# Patient Record
Sex: MALE | Race: BLACK OR AFRICAN AMERICAN
[De-identification: names, ages, dates, MRNs, and addresses within clinical notes are randomized per-mention and may not be internally consistent; named-entity substitution may affect disease eponyms.]

---

## 2019-11-18 ENCOUNTER — HOSPITAL ENCOUNTER (OUTPATIENT)
Dept: HOSPITAL 76 - ED | Age: 59
Setting detail: OBSERVATION
LOS: 2 days | Discharge: ANOTHER HOSPITAL | End: 2019-11-20
Attending: INTERNAL MEDICINE | Admitting: INTERNAL MEDICINE
Payer: COMMERCIAL

## 2019-11-18 DIAGNOSIS — I27.20: ICD-10-CM

## 2019-11-18 DIAGNOSIS — I08.3: ICD-10-CM

## 2019-11-18 DIAGNOSIS — E11.9: ICD-10-CM

## 2019-11-18 DIAGNOSIS — I50.9: ICD-10-CM

## 2019-11-18 DIAGNOSIS — L60.8: ICD-10-CM

## 2019-11-18 DIAGNOSIS — D50.9: ICD-10-CM

## 2019-11-18 DIAGNOSIS — I11.0: Primary | ICD-10-CM

## 2019-11-18 DIAGNOSIS — E87.6: ICD-10-CM

## 2019-11-18 DIAGNOSIS — E83.42: ICD-10-CM

## 2019-11-18 DIAGNOSIS — R06.01: ICD-10-CM

## 2019-11-18 LAB
ALBUMIN DIAFP-MCNC: 3.6 G/DL (ref 3.2–5.5)
ALBUMIN/GLOB SERPL: 1.2 {RATIO} (ref 1–2.2)
ALP SERPL-CCNC: 89 IU/L (ref 42–121)
ALT SERPL W P-5'-P-CCNC: 57 IU/L (ref 10–60)
ANION GAP SERPL CALCULATED.4IONS-SCNC: 9 MMOL/L (ref 6–13)
AST SERPL W P-5'-P-CCNC: 47 IU/L (ref 10–42)
BASOPHILS NFR BLD AUTO: 0 10^3/UL (ref 0–0.1)
BASOPHILS NFR BLD AUTO: 0.8 %
BILIRUB BLD-MCNC: 1.7 MG/DL (ref 0.2–1)
BUN SERPL-MCNC: 9 MG/DL (ref 6–20)
CALCIUM UR-MCNC: 8.8 MG/DL (ref 8.5–10.3)
CHLORIDE SERPL-SCNC: 101 MMOL/L (ref 101–111)
CO2 SERPL-SCNC: 25 MMOL/L (ref 21–32)
CREAT SERPLBLD-SCNC: 0.8 MG/DL (ref 0.6–1.2)
EOSINOPHIL # BLD AUTO: 0.1 10^3/UL (ref 0–0.7)
EOSINOPHIL NFR BLD AUTO: 1.7 %
ERYTHROCYTE [DISTWIDTH] IN BLOOD BY AUTOMATED COUNT: 15.6 % (ref 12–15)
EST. AVERAGE GLUCOSE BLD GHB EST-MCNC: 206 MG/DL (ref 70–100)
GFRSERPLBLD MDRD-ARVRAT: 121 ML/MIN/{1.73_M2} (ref 89–?)
GLOBULIN SER-MCNC: 3 G/DL (ref 2.1–4.2)
GLUCOSE SERPL-MCNC: 209 MG/DL (ref 70–100)
HB2 TOTAL: 12.2 G/DL
HBA1C BLD-MCNC: 0.88 G/DL
HEMOGLOBIN A1C %: 8.8 % (ref 4.6–6.2)
HGB UR QL STRIP: 12 G/DL (ref 14–18)
LIPASE SERPL-CCNC: 22 U/L (ref 22–51)
LYMPHOCYTES # SPEC AUTO: 1.2 10^3/UL (ref 1.5–3.5)
LYMPHOCYTES NFR BLD AUTO: 23.9 %
MCH RBC QN AUTO: 23.4 PG (ref 27–31)
MCHC RBC AUTO-ENTMCNC: 31.2 G/DL (ref 32–36)
MCV RBC AUTO: 75.2 FL (ref 80–94)
MONOCYTES # BLD AUTO: 0.3 10^3/UL (ref 0–1)
MONOCYTES NFR BLD AUTO: 6 %
NEUTROPHILS # BLD AUTO: 3.5 10^3/UL (ref 1.5–6.6)
NEUTROPHILS # SNV AUTO: 5.2 X10^3/UL (ref 4.8–10.8)
NEUTROPHILS NFR BLD AUTO: 67.4 %
PDW BLD AUTO: 9.4 FL (ref 7.4–11.4)
PLATELET # BLD: 308 10^3/UL (ref 130–450)
PROT SPEC-MCNC: 6.6 G/DL (ref 6.7–8.2)
RBC MAR: 5.12 10^6/UL (ref 4.7–6.1)
SODIUM SERPLBLD-SCNC: 135 MMOL/L (ref 135–145)

## 2019-11-18 PROCEDURE — 96375 TX/PRO/DX INJ NEW DRUG ADDON: CPT

## 2019-11-18 PROCEDURE — 84484 ASSAY OF TROPONIN QUANT: CPT

## 2019-11-18 PROCEDURE — 82607 VITAMIN B-12: CPT

## 2019-11-18 PROCEDURE — 83721 ASSAY OF BLOOD LIPOPROTEIN: CPT

## 2019-11-18 PROCEDURE — 83690 ASSAY OF LIPASE: CPT

## 2019-11-18 PROCEDURE — 83735 ASSAY OF MAGNESIUM: CPT

## 2019-11-18 PROCEDURE — 96376 TX/PRO/DX INJ SAME DRUG ADON: CPT

## 2019-11-18 PROCEDURE — 82746 ASSAY OF FOLIC ACID SERUM: CPT

## 2019-11-18 PROCEDURE — 96365 THER/PROPH/DIAG IV INF INIT: CPT

## 2019-11-18 PROCEDURE — 93926 LOWER EXTREMITY STUDY: CPT

## 2019-11-18 PROCEDURE — 93005 ELECTROCARDIOGRAM TRACING: CPT

## 2019-11-18 PROCEDURE — 94640 AIRWAY INHALATION TREATMENT: CPT

## 2019-11-18 PROCEDURE — 99284 EMERGENCY DEPT VISIT MOD MDM: CPT

## 2019-11-18 PROCEDURE — 80053 COMPREHEN METABOLIC PANEL: CPT

## 2019-11-18 PROCEDURE — 80048 BASIC METABOLIC PNL TOTAL CA: CPT

## 2019-11-18 PROCEDURE — 83880 ASSAY OF NATRIURETIC PEPTIDE: CPT

## 2019-11-18 PROCEDURE — 83540 ASSAY OF IRON: CPT

## 2019-11-18 PROCEDURE — 84466 ASSAY OF TRANSFERRIN: CPT

## 2019-11-18 PROCEDURE — 93306 TTE W/DOPPLER COMPLETE: CPT

## 2019-11-18 PROCEDURE — 36415 COLL VENOUS BLD VENIPUNCTURE: CPT

## 2019-11-18 PROCEDURE — 96372 THER/PROPH/DIAG INJ SC/IM: CPT

## 2019-11-18 PROCEDURE — 99285 EMERGENCY DEPT VISIT HI MDM: CPT

## 2019-11-18 PROCEDURE — 80061 LIPID PANEL: CPT

## 2019-11-18 PROCEDURE — 83036 HEMOGLOBIN GLYCOSYLATED A1C: CPT

## 2019-11-18 PROCEDURE — 71046 X-RAY EXAM CHEST 2 VIEWS: CPT

## 2019-11-18 PROCEDURE — 85025 COMPLETE CBC W/AUTO DIFF WBC: CPT

## 2019-11-18 RX ADMIN — INSULIN ASPART SCH UNIT: 100 INJECTION, SOLUTION INTRAVENOUS; SUBCUTANEOUS at 17:21

## 2019-11-18 RX ADMIN — HEPARIN SODIUM SCH UNIT: 5000 INJECTION, SOLUTION INTRAVENOUS; SUBCUTANEOUS at 21:47

## 2019-11-18 RX ADMIN — SODIUM CHLORIDE, PRESERVATIVE FREE SCH ML: 5 INJECTION INTRAVENOUS at 17:22

## 2019-11-18 RX ADMIN — INSULIN ASPART SCH UNIT: 100 INJECTION, SOLUTION INTRAVENOUS; SUBCUTANEOUS at 21:45

## 2019-11-18 RX ADMIN — SODIUM CHLORIDE, PRESERVATIVE FREE SCH ML: 5 INJECTION INTRAVENOUS at 23:58

## 2019-11-18 RX ADMIN — FUROSEMIDE SCH MG: 10 INJECTION, SOLUTION INTRAVENOUS at 17:22

## 2019-11-18 NOTE — ED PHYSICIAN DOCUMENTATION
History of Present Illness





- Stated complaint


Stated Complaint: SOA





- Chief complaint


Chief Complaint: Resp





- History obtained from


History obtained from: Patient, Family





- History of Present Illness


Timing: How many weeks ago (several)


Pain level max: 0


Pain level now: 0





- Additonal information


Additional information: 





59-year-old male, has not seen a doctor in approximately 30 to 40 years.  He 

states that he has been feeling short of breath for the last 3 weeks, worse with

exertion and better with rest.  No chest pain.  Does not smoke.  Has no medical 

problems that he is aware of.  Does not take any medications. Patient states 

that he can only walk approximately 10 to 15 feet before he has to stop and is 

out of breath.  Cannot make it up a flight of stairs without stopping.





Review of Systems


Ten Systems: 10 systems reviewed and negative


Constitutional: denies: Fever, Chills


Ears: denies: Ear pain


Nose: denies: Rhinorrhea / runny nose, Congestion


Respiratory: reports: Dyspnea.  denies: Cough, Wheezing


GI: denies: Nausea, Vomiting, Diarrhea


Skin: denies: Rash


Musculoskeletal: denies: Neck pain, Back pain


Neurologic: denies: Headache





PD PAST MEDICAL HISTORY





- Past Medical History


Past Medical History: No





- Past Surgical History


Past Surgical History: No





- Allergies


Allergies/Adverse Reactions: 


                                    Allergies











Allergy/AdvReac Type Severity Reaction Status Date / Time


 


No Known Drug Allergies Allergy   Verified 11/18/19 10:50














- Living Situation


Living Situation: reports: With family


Living Arrangement: reports: At home





- Social History


Does the pt smoke?: No


Does the pt have substance abuse?: No





- Family History


Family history: reports: Non contributory





PD ED PE NORMAL





- Vitals


Vital signs reviewed: Yes





- General


General: Alert and oriented X 3, No acute distress





- HEENT


HEENT: Moist mucous membranes





- Neck


Neck: Supple, no meningeal sign





- Cardiac


Cardiac: RRR





- Respiratory


Respiratory: Other (Rales bilaterally)





- Abdomen


Abdomen: Soft, Non tender, Non distended





- Derm


Derm: Warm and dry





- Extremities


Extremities: Other (2+ pitting edema bilateral lower extremity)





- Neuro


Neuro: Alert and oriented X 3





Results





- Vitals


Vitals: 


                               Vital Signs - 24 hr











  11/18/19 11/18/19





  10:47 11:36


 


Temperature 35 C L 


 


Heart Rate 114 H 102 H


 


Respiratory 18 14





Rate  


 


Blood Pressure 149/128 H 


 


O2 Saturation 95 








                                     Oxygen











O2 Source                      Room air

















- EKG (time done)


  ** 1054


Rate: Rate (enter#) (112)


Rhythm: Sinus tachycardia


Axis: Normal


Intervals: Normal ND


QRS: Normal


Ischemia: Non specific changes





- Labs


Labs: 


                                Laboratory Tests











  11/18/19 11/18/19 11/18/19





  11:39 11:39 11:39


 


WBC  5.2  


 


RBC  5.12  


 


Hgb  12.0 L  


 


Hct  38.5 L  


 


MCV  75.2 L  


 


MCH  23.4 L  


 


MCHC  31.2 L  


 


RDW  15.6 H  


 


Plt Count  308  


 


MPV  9.4  


 


Neut # (Auto)  3.5  


 


Lymph # (Auto)  1.2 L  


 


Mono # (Auto)  0.3  


 


Eos # (Auto)  0.1  


 


Baso # (Auto)  0.0  


 


Absolute Nucleated RBC  0.00  


 


Nucleated RBC %  0.0  


 


Sodium   135 


 


Potassium   3.7 


 


Chloride   101 


 


Carbon Dioxide   25 


 


Anion Gap   9.0 


 


BUN   9 


 


Creatinine   0.8 


 


Estimated GFR (MDRD)   121 


 


Glucose   209 H 


 


Glycated Hemoglobin   


 


Estim Average Glucose   


 


Calcium   8.8 


 


Total Bilirubin   1.7 H 


 


AST   47 H 


 


ALT   57 


 


Alkaline Phosphatase   89 


 


Troponin I High Sens    58.1 H*


 


B-Natriuretic Peptide   


 


Total Protein   6.6 L 


 


Albumin   3.6 


 


Globulin   3.0 


 


Albumin/Globulin Ratio   1.2 


 


Lipase   22 














  11/18/19 11/18/19





  11:39 11:39


 


WBC  


 


RBC  


 


Hgb  


 


Hct  


 


MCV  


 


MCH  


 


MCHC  


 


RDW  


 


Plt Count  


 


MPV  


 


Neut # (Auto)  


 


Lymph # (Auto)  


 


Mono # (Auto)  


 


Eos # (Auto)  


 


Baso # (Auto)  


 


Absolute Nucleated RBC  


 


Nucleated RBC %  


 


Sodium  


 


Potassium  


 


Chloride  


 


Carbon Dioxide  


 


Anion Gap  


 


BUN  


 


Creatinine  


 


Estimated GFR (MDRD)  


 


Glucose  


 


Glycated Hemoglobin   8.8 H


 


Estim Average Glucose   206 H


 


Calcium  


 


Total Bilirubin  


 


AST  


 


ALT  


 


Alkaline Phosphatase  


 


Troponin I High Sens  


 


B-Natriuretic Peptide  1477 H 


 


Total Protein  


 


Albumin  


 


Globulin  


 


Albumin/Globulin Ratio  


 


Lipase  














- Rads (name of study)


  ** CXR


Radiology: Prelim report reviewed, EMP read contemporaneously, See rad report (.

Heterogeneous mid and lower lung opacities could represent atypical infection. 

Recommend follow-up to resolution. 2. Possible airways disease )





PD MEDICAL DECISION MAKING





- ED course


Complexity details: reviewed results, re-evaluated patient, considered 

differential, d/w patient, d/w family, d/w consultant


ED course: 





59-year-old male presents to the emergency department with dyspnea for the past 

several weeks, slowly increasing.  Now unable to walk more than approximately 10

to 15 feet without stopping because he is short of breath.  Appears to have new 

onset congestive heart failure.  Given Lasix here.  Also appears to have a new 

onset diabetes.  As patient is having significant dyspnea, will admit for 

diuresis and further evaluation.  He does not have a primary care doctor.  

Discussed the case with Dr. Mortensen, hospitalist who accepts.





This document was made in part using voice recognition software. While efforts 

are made to proofread this document, sound alike and grammatical errors may 

occur.





Departure





- Departure


Disposition: [facility] DC/Xfer


Clinical Impression: 


 New onset of congestive heart failure, New onset type 2 diabetes mellitus





Pulmonary edema


Qualifiers:


 Chronicity: acute Qualified Code(s): J81.0 - Acute pulmonary edema





Condition: Stable


Discharge Date/Time: 11/18/19 13:42

## 2019-11-18 NOTE — HISTORY & PHYSICAL EXAMINATION
Chief Complaint





- Chief Complaint


Chief Complaint: Shortness of breath





History of Present Illness





- Admitted From


Admitted From:: Home





- History Obtained From


Records Reviewed: Yes


History obtained from: Patient, ER Physician





- History of Present Illness


HPI Comment/Other: 


This is a 59-year-old male with no known past medical history who has not seen a

physician in approximately 30 years who presents complaining of worsening 

shortness of breath with exertion for the past 3 weeks.He states his breathing 

has progressively declined and he is now barely able to ambulate to his 

bathroom.  He also reports orthopnea that he has had difficulty sleeping at 

night.  He does not recall having lower extremity swelling.  He reports no chest

pain but does endorse palpitations and a cough.  He has had no fever, abdominal 

pain, nausea, vomiting.  He does not smoke and only drinks alcohol rarely.  He 

does have a history of drug use and recently used cocaine about 1 week ago.  He 

reports using approximately every month.  Report reports a remote history of 

meth use.  He also smokes marijuana but denies the use of heroin.  He reports a 

family history of hypertension, diabetes, and congestive heart failure.  He came

to the hospital today because his sister kept on pushing him to seek medical 

attention.He does not have a primary care physician.





In the emergency department, he was found to be tachycardic, tachypneic but he 

was not hypoxic.  Chest x-ray revealed bilateral opacities.He was found to have 

an A1c of 8.8%, troponin of 58.1, and a BNP of 1477.  Given these findings, 

medicine was consulted for admission.








History





- Past Medical History


Cardiovascular: reports: None


Respiratory: reports: None


Neuro: reports: None


Endocrine/Autoimmune: reports: None





- Family & Social History


Family History Comment/Other: Reports family history of hypertension, diabetes. 

His mother passed away from congestive heart failure.


Living arrangement: At home


Living Situation: With family


Social History Notes: He is currently not employed but was previous he working 

in [place].  He does not smoke.  Reports social use of alcohol.  He does use

cocaine with most recent use being 1 week ago.  He also uses marijuana.  Reports

a remote history of meth use. Denies heroin use.





- POLST


Patient has POLST: No





Meds/Allgy





- Home Medications


Home Medications: 


                                Ambulatory Orders











 Medication  Instructions  Recorded  Confirmed


 


No Known Home Medications  11/18/19 11/18/19














- Allergies


Allergies/Adverse Reactions: 


                                    Allergies











Allergy/AdvReac Type Severity Reaction Status Date / Time


 


No Known Drug Allergies Allergy   Verified 11/18/19 10:50














Review of Systems





- Constitutional


Constitutional: denies: Fatigue, Fever, Chills, Weakness, Poor appetite





- Cardiovascular


Cariovascular: reports: Palpitations, Exertional dyspnea, Decr. exercise to

lerance.  denies: Chest pain, Edema





- Respiratory


Respiratory: reports: Cough, Orthopnea, SOB with exertion.  denies: SOB at rest





- Gastrointestinal


Gastrointestinal: denies: Abdominal pain, Abdominal distention, Nausea, Vomiting





- Genitourinary


Genitourinary: denies: Dysuria, Frequency, Urgency





- Musculoskeletal


Musculoskeletal: denies: Muscle weakness





- Neurological


Neurological: denies: General weakness, Focal weakness, Numbness





- All Other Systems


All Other Systems: reports: Reviewed and negative


Prior Level of Functionality: 


Independent with ADL's.








Exam





- Vital Signs


Reviewed Vital Signs: Yes


Vital Signs: 





                                Vital Signs x48h











  Temp Pulse Resp BP Pulse Ox


 


 11/18/19 11:36   102 H  14  


 


 11/18/19 10:47  35 C L  114 H  18  149/128 H  95














- Physical Exam


General Appearance: positive: No acute distress, Alert


Eyes Bilateral: positive: Normal inspection


ENT: positive: ENT inspection nml


Neck: positive: Nml inspection


Respiratory: positive: Rales, Other (Tachypneic with diminished breath sounds.  

No wheezes or rhonchi.)


Cardiovascular: positive: No murmur, Tachycardia.  negative: Systolic murmur, 

Diastolic murmur


Abdomen: positive: Non-tender, No distention.  negative: Tenderness, Guarding, 

Rebound, Hepatomegaly, Splenomegaly


Skin: positive: No rash, Warm, Dry


Extremities: positive: Full ROM, Pedal edema (+1 pitting edema in bilateral 

lower extremities.)


Neurologic/Psychiatric: positive: Oriented x3, Motor nml.  negative: Disoriented

to person, Disoriented to place, Disoriented to time, Weakness





Conclusion/Plan





- Problem List


(1) Pulmonary edema


Conclusion/Plan: 


His presentation given his x-ray findings, elevated BNP are concerning for new 

onset heart failure.  He does have risk factors including diabetes for possible 

heart disease. He may have also had poorly controlled hypertension leading to 

heart failure.  His drug use may also cause heart failure.  We will obtain an 

echocardiogram.  We will treat him with IV Lasix.  Recheck a BNP in the 

morning.If he does have new onset heart failure, he will need appropriate goal-

directed therapy and outpatient cardiology follow-up.


Qualifiers: 


   Chronicity: acute   Qualified Code(s): J81.0 - Acute pulmonary edema   





(2) Elevated troponin


Conclusion/Plan: 


He does not have chest pain and his EKG does not show acute ischemia.  Suspect 

his elevated troponin is likely secondary to heart failure.  We will continue to

trend troponin.








(3) Hypertension


Conclusion/Plan: 


His blood pressure is elevated and he likely has hypertension.  We will start 

him on antihypertensives.  If he does have heart failure, will start carvedilol 

and lisinopril.








(4) New onset type 2 diabetes mellitus


Conclusion/Plan: 


This is a new diagnosis for him and his A1c is 8.8.%. Will start him on carb 

controlled diet and sliding scale. Will add Lantus if required while inpatient. 

Likely discharge on Metformin.








- Lab Results


Fish Bones: 


                                 11/18/19 11:39





                                 11/18/19 11:39





- Diagnostic Imaging Results


Diagnostic Imaging Results: positive: Final report reviewed, Read independently


Diagnostic Imaging Results Comments: 


His x-ray is suggestive of pulmonary vascular congestion.  Less likely 

pneumonia.








- EKG Results


EKG Interpreted Independently: Yes


EKG Findings: 


His EKG reveals sinus tachycardia with nonspecific ST segment changes.  No 

obvious signs of ischemia.  QTC is prolonged at 490.








Core Measures





- Anticipated LOS


I expect patient to be DC'd or transferred within 96 hours.: Yes





- Issues


Hospital Issues and Management Plan: 


Pulmonary edema and exertional dyspnea concerning for new onset heart failure.  

Will obtain echocardiogram and diurese him with IV Lasix.








- DVT/VTE - Prophylaxis


VTE/DVT Device ordered at admit?: Yes


VTE/DVT Prophylaxis med ordered at admit?: Yes

## 2019-11-18 NOTE — XRAY REPORT
Reason:  SOA/tachycardia

Procedure Date:  11/18/2019   

Accession Number:  [id number] / [id number]                    

Procedure:  XR  - Chest 2 View X-Ray CPT Code:  69612

 

***Final Report***

 

 

FULL RESULT:

 

 

EXAM:

CHEST RADIOGRAPHY

 

EXAM DATE: 11/18/2019 11:08 AM.

 

CLINICAL HISTORY: SOA/tachycardia.

 

COMPARISON: None.

 

TECHNIQUE: 2 views.

 

FINDINGS:

Lungs/Pleura: Increased lung markings. Heterogeneous bilateral mid and 

lower lung opacities.

 

Mediastinum: Heart and mediastinal contours are unremarkable.

 

Other: None.

IMPRESSION:

1. Heterogeneous mid and lower lung opacities could represent atypical 

infection. Recommend follow-up to resolution.

2. Possible airways disease

 

RADIA

## 2019-11-19 LAB
ANION GAP SERPL CALCULATED.4IONS-SCNC: 10 MMOL/L (ref 6–13)
BASOPHILS NFR BLD AUTO: 0 10^3/UL (ref 0–0.1)
BASOPHILS NFR BLD AUTO: 0.9 %
BUN SERPL-MCNC: 11 MG/DL (ref 6–20)
CALCIUM UR-MCNC: 8.5 MG/DL (ref 8.5–10.3)
CHLORIDE SERPL-SCNC: 99 MMOL/L (ref 101–111)
CHOLEST SERPL-MCNC: 136 MG/DL
CO2 SERPL-SCNC: 29 MMOL/L (ref 21–32)
CREAT SERPLBLD-SCNC: 0.8 MG/DL (ref 0.6–1.2)
EOSINOPHIL # BLD AUTO: 0.2 10^3/UL (ref 0–0.7)
EOSINOPHIL NFR BLD AUTO: 3.8 %
ERYTHROCYTE [DISTWIDTH] IN BLOOD BY AUTOMATED COUNT: 15.5 % (ref 12–15)
GFRSERPLBLD MDRD-ARVRAT: 121 ML/MIN/{1.73_M2} (ref 89–?)
GLUCOSE SERPL-MCNC: 135 MG/DL (ref 70–100)
HDLC SERPL-MCNC: 30 MG/DL
HDLC SERPL: 4.5 {RATIO} (ref ?–5)
HGB UR QL STRIP: 11.8 G/DL (ref 14–18)
LDLC SERPL CALC-MCNC: 94 MG/DL
LDLC/HDLC SERPL: 3.1 {RATIO} (ref ?–3.6)
LYMPHOCYTES # SPEC AUTO: 1.7 10^3/UL (ref 1.5–3.5)
LYMPHOCYTES NFR BLD AUTO: 36.4 %
MCH RBC QN AUTO: 23.8 PG (ref 27–31)
MCHC RBC AUTO-ENTMCNC: 31.7 G/DL (ref 32–36)
MCV RBC AUTO: 75 FL (ref 80–94)
MONOCYTES # BLD AUTO: 0.4 10^3/UL (ref 0–1)
MONOCYTES NFR BLD AUTO: 8.4 %
NEUTROPHILS # BLD AUTO: 2.3 10^3/UL (ref 1.5–6.6)
NEUTROPHILS # SNV AUTO: 4.5 X10^3/UL (ref 4.8–10.8)
NEUTROPHILS NFR BLD AUTO: 50.5 %
PDW BLD AUTO: 9.6 FL (ref 7.4–11.4)
PLATELET # BLD: 309 10^3/UL (ref 130–450)
RBC MAR: 4.96 10^6/UL (ref 4.7–6.1)
SODIUM SERPLBLD-SCNC: 138 MMOL/L (ref 135–145)
VLDLC SERPL-SCNC: 12 MG/DL

## 2019-11-19 RX ADMIN — INSULIN ASPART SCH: 100 INJECTION, SOLUTION INTRAVENOUS; SUBCUTANEOUS at 08:02

## 2019-11-19 RX ADMIN — SODIUM CHLORIDE, PRESERVATIVE FREE PRN ML: 5 INJECTION INTRAVENOUS at 14:42

## 2019-11-19 RX ADMIN — INSULIN ASPART SCH UNIT: 100 INJECTION, SOLUTION INTRAVENOUS; SUBCUTANEOUS at 12:34

## 2019-11-19 RX ADMIN — INSULIN ASPART SCH: 100 INJECTION, SOLUTION INTRAVENOUS; SUBCUTANEOUS at 16:54

## 2019-11-19 RX ADMIN — INSULIN ASPART SCH UNIT: 100 INJECTION, SOLUTION INTRAVENOUS; SUBCUTANEOUS at 20:34

## 2019-11-19 RX ADMIN — SODIUM CHLORIDE, PRESERVATIVE FREE SCH ML: 5 INJECTION INTRAVENOUS at 10:11

## 2019-11-19 RX ADMIN — FUROSEMIDE SCH MG: 10 INJECTION, SOLUTION INTRAVENOUS at 14:42

## 2019-11-19 RX ADMIN — SODIUM CHLORIDE, PRESERVATIVE FREE SCH ML: 5 INJECTION INTRAVENOUS at 16:57

## 2019-11-19 RX ADMIN — FUROSEMIDE SCH MG: 10 INJECTION, SOLUTION INTRAVENOUS at 05:24

## 2019-11-19 RX ADMIN — SODIUM CHLORIDE, PRESERVATIVE FREE PRN ML: 5 INJECTION INTRAVENOUS at 05:24

## 2019-11-19 RX ADMIN — HEPARIN SODIUM SCH UNIT: 5000 INJECTION, SOLUTION INTRAVENOUS; SUBCUTANEOUS at 20:36

## 2019-11-19 RX ADMIN — ASPIRIN SCH MG: 81 TABLET, COATED ORAL at 10:10

## 2019-11-19 RX ADMIN — SODIUM CHLORIDE, PRESERVATIVE FREE SCH ML: 5 INJECTION INTRAVENOUS at 23:58

## 2019-11-19 RX ADMIN — HEPARIN SODIUM SCH UNIT: 5000 INJECTION, SOLUTION INTRAVENOUS; SUBCUTANEOUS at 10:11

## 2019-11-19 NOTE — ULTRASOUND REPORT
Reason:  L great toe is black, eval for ischemia/PVD

Procedure Date:  11/19/2019   

Accession Number:  [id number] / [id number]                    

Procedure:  US  - Duplex Lwr Ext Arterial LT CPT Code:  

 

***Final Report***

 

 

FULL RESULT:

 

 

EXAM:

LEFT LOWER EXTREMITY ARTERIAL DOPPLER ULTRASOUND

 

EXAM DATE: 11/19/2019 01:53 PM.

 

CLINICAL HISTORY: Left great toe is black, evaluate for ischemia/PVD.

 

COMPARISON: None.

 

TECHNIQUE: Real-time sonographic vascular imaging was performed by the 

sonographer, utilizing color-flow, Doppler flow, and spectral analysis. 

Multiple representative static images were saved for review.

 

FINDINGS: Normal triphasic waveforms are seen throughout the patent left 

lower extremity arterial vasculature with sampled stations and respective 

velocities in centimeters per second as described below.

 

Left Lower Extremity:

CFA: PSV 51 cm/sec.

PSFA: PSV 48 cm/sec.

MSFA: PSV 59 cm/sec.

DSFA: PSV 54 cm/sec.

PFA: PSV 40 cm/sec.

POP: PSV 48 cm/sec.

GARTH: PSV 76 cm/sec.

PTA: PSV 83 cm/sec.

TISH: PSV 43 cm/sec.

DPA: PSV 71 cm/sec.

IMPRESSION: Normal left lower extremity arteries as described.

 

Recommendation: Vascular etiology of black toes in the setting of normal 

lower extremity vascular system is most commonly associated with 

thromboembolic disease.

 

 

 

RADIA

## 2019-11-19 NOTE — PROVIDER PROGRESS NOTE
Assessment/Plan





- Problem List


(1) New onset of congestive heart failure


Assessment/Plan: 


The Echo showed LVEF 20-30% with global hypokainesis and moderate pulm HTN.


He is in (-)2L fluid balance today and less orthopneic, he reported.


Continue Coreg and Lisinopril, started yesterday.


Continue iv diuretic for 1 more day, change to po Lasix tomorrow.


Will also start Aldactone  now.


Will add a daily baby ASA, for possible ischemic cardiomyopathy.


Given his young age, and risks for vascular disease, will try to get transferred

for a cor angio. The angio procedure was explained to the patient, he is agree

able.








(2) New onset type 2 diabetes mellitus


Assessment/Plan: 


He reports that DM runs in the family.


Will change to a cc diet, low sodium.


continue ss Insulin coverage.


Start metformin.


Will request Dietician Consult, for DM and CHF teaching.








(3) Hypertension


Assessment/Plan: 


He may have had lonstanding HTN that was uncontrolled, leading to 

cardiomyopathy.


The new CHF meds are now controlling his BP.








(4) Hypokalemia


Assessment/Plan: 


Likely related to diuresis.


Replace orally.


Follow BMP and Mg daily.








(5) Black toe


Assessment/Plan: 


The RN reported this today. He says it turned black about 1 week ago, was not 

painful, he cut the nail down so it wouldn't snag.


Will assess for ischemia with Duplex Doppler of L leg.


Add daily anti-platelet agent.











- Current Meds


Current Meds: 





                               Current Medications











Generic Name Dose Route Start Last Admin





  Trade Name Elena  PRN Reason Stop Dose Admin


 


Aspirin  81 mg  11/19/19 09:00  11/19/19 10:10





  Ecotrin  PO   81 mg





  DAILY REGINA   Administration





     





     





     





     


 


Carvedilol  6.25 mg  11/18/19 21:00  11/19/19 10:11





  Coreg  PO   6.25 mg





  BID REGINA   Administration





     





     





     





     


 


Furosemide  40 mg  11/18/19 18:00  11/19/19 14:42





  Lasix Inj 40 Mg Vial  IVP   40 mg





  BIDDIURETIC REGINA   Administration





     





     





     





     


 


Heparin Sodium (Porcine)  5,000 unit  11/18/19 21:00  11/19/19 10:11





    SUBQ   5,000 unit





  BID REGINA   Administration





     





     





     





     


 


Insulin Aspart  1 - 9 unit  11/18/19 17:00  11/19/19 12:34





  Novolog  SUBQ   5 unit





  0800,1200,1700,2100 REGINA   Administration





     





     





  Protocol   





     


 


Lisinopril  10 mg  11/19/19 09:00  11/19/19 10:09





  Zestril  PO   10 mg





  DAILY REGINA   Administration





     





     





     





     


 


Metformin HCl  500 mg  11/19/19 09:00  11/19/19 10:10





  Glucophage  PO   500 mg





  BIDWM REGINA   Administration





     





     





     





     


 


Sodium Chloride  10 ml  11/18/19 13:00  11/19/19 14:42





  Normal Saline Flush 0.9%  IVP   10 ml





  PRN PRN   Administration





  AS NEEDED PER PROVIDER ORDERS   





     





     





     


 


Sodium Chloride  10 ml  11/18/19 17:00  11/19/19 10:11





  Normal Saline Flush 0.9%  IVP   10 ml





  0100,0900,1700 REGINA   Administration





     





     





     





     


 


Spironolactone  25 mg  11/19/19 09:00  11/19/19 10:09





  Aldactone  PO   25 mg





  DAILY REGINA   Administration





     





     





     





     














- Lab Result


Fish Bone Diagrams: 


                                 11/19/19 05:02





                                 11/19/19 05:02





- Additional Planning


My Orders: 





My Active Orders





11/19/19 08:18


Nutrition Consult [CONS] Routine 





11/19/19 09:00


Aspirin EC [Ecotrin]   81 mg PO DAILY 


Spironolactone [Aldactone]   25 mg PO DAILY 


metFORMIN [Glucophage]   500 mg PO BIDWM 





11/19/19 Dinner


Carb-controlled Diet [DIET] 





11/20/19 05:00


FOLATE [IAI] DAILYLAB 


IRON TIBC PANEL [CHEM] DAILYLAB 


VITAMIN B12 [IAI] DAILYLAB 














Subjective





- Subjective


Patient Reports: Feeling Better, Resting Comfortably


Nursing Reports: Other (Has questions about DM and CHF)





Objective


Vital Signs: 





                               Vital Signs - 24 hr











  11/18/19 11/18/19 11/18/19





  16:44 17:00 21:00


 


Temperature 35.0 C L 36.8 C 36.9 C


 


Heart Rate 112 H  


 


Heart Rate [  105 H 119 H





Brachial]   


 


Heart Rate [   





Monitoring   





electrodes]   


 


Respiratory 20 20 22





Rate   


 


Blood Pressure  161/105 H 151/90 H





[Right Brachial   





artery]   


 


O2 Saturation 97 98 96














  11/18/19 11/18/19 11/19/19





  21:43 23:59 01:19


 


Temperature  36.8 C 


 


Heart Rate   


 


Heart Rate [  100 99





Brachial]   


 


Heart Rate [ 107 H  





Monitoring   





electrodes]   


 


Respiratory  16 





Rate   


 


Blood Pressure 151/84 H 134/101 H 132/92 H





[Right Brachial   





artery]   


 


O2 Saturation  97 














  11/19/19 11/19/19 11/19/19





  02:52 07:57 13:00


 


Temperature 37.0 C 36.8 C 36.8 C


 


Heart Rate   


 


Heart Rate [   97





Brachial]   


 


Heart Rate [ 97 101 H 





Monitoring   





electrodes]   


 


Respiratory 16 16 16





Rate   


 


Blood Pressure 128/82 H 144/97 H 108/65





[Right Brachial   





artery]   


 


O2 Saturation 94 97 96








                                     Oxygen











O2 Source                      Room air














I&O (Last 24 Hrs): 





                          Intake and Output Totals x24h











 11/17/19 11/18/19 11/19/19





 23:59 23:59 23:59


 


Intake Total  710 1680


 


Output Total  2515 1350


 


Balance  -1805 330











General: Alert, Oriented x3


HEENT: Mucous membr. moist/pink


Neck: Supple


Neuro: Non Focal


Cardiovascular: Regular rate, No murmurs


Respiratory: No respiratory distress, Rales, Other (L base rales)


Abdomen: Normal bowel sounds, Soft


Extremities: Other (Trace ankle edema, L toe nailbed is black)





- Results


Results: 





                               Laboratory Results











WBC  4.5 x10^3/uL (4.8-10.8)  L  11/19/19  05:02    


 


RBC  4.96 10^6/uL (4.70-6.10)   11/19/19  05:02    


 


Hgb  11.8 g/dL (14.0-18.0)  L  11/19/19  05:02    


 


Hct  37.2 % (42.0-52.0)  L  11/19/19  05:02    


 


MCV  75.0 fL (80.0-94.0)  L  11/19/19  05:02    


 


MCH  23.8 pg (27.0-31.0)  L  11/19/19  05:02    


 


MCHC  31.7 g/dL (32.0-36.0)  L  11/19/19  05:02    


 


RDW  15.5 % (12.0-15.0)  H  11/19/19  05:02    


 


Plt Count  309 10^3/uL (130-450)   11/19/19  05:02    


 


MPV  9.6 fL (7.4-11.4)   11/19/19  05:02    


 


Neut # (Auto)  2.3 10^3/uL (1.5-6.6)   11/19/19  05:02    


 


Lymph # (Auto)  1.7 10^3/uL (1.5-3.5)   11/19/19  05:02    


 


Mono # (Auto)  0.4 10^3/uL (0.0-1.0)   11/19/19  05:02    


 


Eos # (Auto)  0.2 10^3/uL (0.0-0.7)   11/19/19  05:02    


 


Baso # (Auto)  0.0 10^3/uL (0.0-0.1)   11/19/19  05:02    


 


Absolute Nucleated RBC  0.00 x10^3/uL  11/19/19  05:02    


 


Nucleated RBC %  0.0 /100WBC  11/19/19  05:02    


 


Sodium  138 mmol/L (135-145)   11/19/19  05:02    


 


Potassium  3.3 mmol/L (3.5-5.0)  L  11/19/19  05:02    


 


Chloride  99 mmol/L (101-111)  L  11/19/19  05:02    


 


Carbon Dioxide  29 mmol/L (21-32)   11/19/19  05:02    


 


Anion Gap  10.0  (6-13)   11/19/19  05:02    


 


BUN  11 mg/dL (6-20)   11/19/19  05:02    


 


Creatinine  0.8 mg/dL (0.6-1.2)   11/19/19  05:02    


 


Estimated GFR (MDRD)  121  (>89)   11/19/19  05:02    


 


Glucose  135 mg/dL ()  H  11/19/19  05:02    


 


POC Whole Bld Glucose  274 mg/dL (70 - 100)  H  11/19/19  11:13    


 


Glycated Hemoglobin  8.8 % (4.6-6.2)  H  11/18/19  11:39    


 


Estim Average Glucose  206  ()  H  11/18/19  11:39    


 


Calcium  8.5 mg/dL (8.5-10.3)   11/19/19  05:02    


 


Total Bilirubin  1.7 mg/dL (0.2-1.0)  H  11/18/19  11:39    


 


AST  47 IU/L (10-42)  H  11/18/19  11:39    


 


ALT  57 IU/L (10-60)   11/18/19  11:39    


 


Alkaline Phosphatase  89 IU/L ()   11/18/19  11:39    


 


Troponin I High Sens  56.0 ng/L (2.3-19.7)  H*  11/18/19  13:25    


 


B-Natriuretic Peptide  1284 pg/mL (5-100)  H  11/19/19  05:02    


 


Total Protein  6.6 g/dL (6.7-8.2)  L  11/18/19  11:39    


 


Albumin  3.6 g/dL (3.2-5.5)   11/18/19  11:39    


 


Globulin  3.0 g/dL (2.1-4.2)   11/18/19  11:39    


 


Albumin/Globulin Ratio  1.2  (1.0-2.2)   11/18/19  11:39    


 


Triglycerides  62 mg/dL (-149)  11/19/19  05:02    


 


Cholesterol  136 mg/dL (-199)  11/19/19  05:02    


 


LDL Cholesterol, Calc  94 mg/dL (-129)  11/19/19  05:02    


 


VLDL Cholesterol  12 mg/dL  11/19/19  05:02    


 


HDL Cholesterol  30 mg/dL (60-) L  11/19/19  05:02    


 


LDL/HDL Ratio  3.1  (<3.6)   11/19/19  05:02    


 


Cholesterol/HDL Ratio  4.5  (<5.0)   11/19/19  05:02    


 


Lipase  22 U/L (22-51)   11/18/19  11:39

## 2019-11-20 VITALS — DIASTOLIC BLOOD PRESSURE: 72 MMHG | SYSTOLIC BLOOD PRESSURE: 107 MMHG

## 2019-11-20 LAB
ANION GAP SERPL CALCULATED.4IONS-SCNC: 10 MMOL/L (ref 6–13)
BASOPHILS NFR BLD AUTO: 0 10^3/UL (ref 0–0.1)
BASOPHILS NFR BLD AUTO: 0.7 %
BUN SERPL-MCNC: 15 MG/DL (ref 6–20)
CALCIUM UR-MCNC: 8.2 MG/DL (ref 8.5–10.3)
CHLORIDE SERPL-SCNC: 96 MMOL/L (ref 101–111)
CO2 SERPL-SCNC: 28 MMOL/L (ref 21–32)
CREAT SERPLBLD-SCNC: 1.1 MG/DL (ref 0.6–1.2)
EOSINOPHIL # BLD AUTO: 0.2 10^3/UL (ref 0–0.7)
EOSINOPHIL NFR BLD AUTO: 5.2 %
ERYTHROCYTE [DISTWIDTH] IN BLOOD BY AUTOMATED COUNT: 15.3 % (ref 12–15)
FOLATE SERPL-MCNC: 5.73 NG/ML
GFRSERPLBLD MDRD-ARVRAT: 84 ML/MIN/{1.73_M2} (ref 89–?)
GLUCOSE SERPL-MCNC: 125 MG/DL (ref 70–100)
HGB UR QL STRIP: 11.9 G/DL (ref 14–18)
IRON SATN MFR SERPL: 20 % (ref 20–50)
IRON SERPL-MCNC: 45 UG/DL (ref 45–182)
LYMPHOCYTES # SPEC AUTO: 1.9 10^3/UL (ref 1.5–3.5)
LYMPHOCYTES NFR BLD AUTO: 43.5 %
MAGNESIUM SERPL-MCNC: 1.5 MG/DL (ref 1.7–2.8)
MCH RBC QN AUTO: 23.4 PG (ref 27–31)
MCHC RBC AUTO-ENTMCNC: 31.1 G/DL (ref 32–36)
MCV RBC AUTO: 75.4 FL (ref 80–94)
MONOCYTES # BLD AUTO: 0.3 10^3/UL (ref 0–1)
MONOCYTES NFR BLD AUTO: 7 %
NEUTROPHILS # BLD AUTO: 1.9 10^3/UL (ref 1.5–6.6)
NEUTROPHILS # SNV AUTO: 4.5 X10^3/UL (ref 4.8–10.8)
NEUTROPHILS NFR BLD AUTO: 43.4 %
PDW BLD AUTO: 9.7 FL (ref 7.4–11.4)
PLATELET # BLD: 325 10^3/UL (ref 130–450)
RBC MAR: 5.08 10^6/UL (ref 4.7–6.1)
SODIUM SERPLBLD-SCNC: 134 MMOL/L (ref 135–145)
TIBC SERPL-MCNC: 223 UG/DL (ref 250–450)
TRANSFERRIN SERPL-MCNC: 159 MG/DL (ref 180–329)
VIT B12 SERPL-MCNC: 296 PG/ML (ref 180–914)

## 2019-11-20 RX ADMIN — HEPARIN SODIUM SCH UNIT: 5000 INJECTION, SOLUTION INTRAVENOUS; SUBCUTANEOUS at 08:59

## 2019-11-20 RX ADMIN — FUROSEMIDE SCH MG: 10 INJECTION, SOLUTION INTRAVENOUS at 05:52

## 2019-11-20 RX ADMIN — SODIUM CHLORIDE, PRESERVATIVE FREE SCH ML: 5 INJECTION INTRAVENOUS at 05:52

## 2019-11-20 RX ADMIN — SODIUM CHLORIDE, PRESERVATIVE FREE SCH ML: 5 INJECTION INTRAVENOUS at 10:22

## 2019-11-20 RX ADMIN — INSULIN ASPART SCH UNIT: 100 INJECTION, SOLUTION INTRAVENOUS; SUBCUTANEOUS at 12:06

## 2019-11-20 RX ADMIN — INSULIN ASPART SCH: 100 INJECTION, SOLUTION INTRAVENOUS; SUBCUTANEOUS at 09:03

## 2019-11-20 RX ADMIN — ASPIRIN SCH MG: 81 TABLET, COATED ORAL at 09:01

## 2019-11-20 NOTE — DISCHARGE SUMMARY
Discharge Summary


Admit Date: 11/18/19


Discharge Date: 11/20/19


Discharging Provider: Dr Irina Adan


Primary Care Provider: None, pending at [facility]


Code Status: Attempt Resuscitation


Condition at Discharge: Stable


Discharge Disposition: 02 Transfer Acute Care Hosp


Discharge Facility Name: [facility]





- DIAGNOSES


Admission Diagnoses: 





(1) Pulmonary edema





(2) Elevated troponin





(3) Hypertension





(4) New onset type 2 diabetes mellitus





Discharge Diagnoses with Status of Each Condition: 





See below








- HPI


History of Present Illness: 





From the admission H&P of Dr Denis Mortensen:


This is a 59-year-old male with no known past medical history who has not seen a

physician in approximately 30 years who presents complaining of worsening 

shortness of breath with exertion for the past 3 weeks. He states his breathing 

has progressively declined and he is now barely able to ambulate to his 

bathroom.  He also reports orthopnea that he has had difficulty sleeping at 

night.  He does not recall having lower extremity swelling but the sister 

noticed that.  He reports no chest pain but does describe palpitations and a 

cough.  He has had no fever, abdominal pain, nausea, vomiting.  He does not 

smoke and only drinks alcohol rarely.  He does have a history of drug use and 

recently used cocaine about 1 week ago.  He reports using approximately once a 

month and also reports a remote history of meth use.  He also smokes marijuana 

but denies the use of heroin.  He reports a family history of hypertension, 

diabetes, and congestive heart failure.  He came to the hospital today because 

his sister kept on pushing him to seek medical attention. He does not have a 

primary care physician.


In the emergency department, he was found to be tachycardic at 117 in sinus 

rhythm, tachypneic but he was not hypoxic.  Chest x-ray revealed bilateral 

opacities consistent with edema. He was found to have an A1c of 8.8%, troponin 

of 58.1, and a BNP of 1477.  He was placed in Observation for evaluation and 

management.








- HOSPITAL COURSE


Hospital Course: 





(1) New onset of congestive heart failure


The troponins were flat, the BNP improved to 1284 with diuresis started. An Echo

showed LVEF 25-30% with global hypokinesis and moderate pulm HTN (PA pressure 60

mmHg). He was put on Coreg, Lisinopril, iv then po Lasix, Spironolactone and a 

baby aspirin daily. He became 5L (-) in fluid balance and was no longer 

orthopneic.  Given his young age, and risks for vascular disease, plus concern 

to be lost to follow-up without a PCP, he was accepted in transfer to [facility], for work-up of coronary ischemia, with a probable coronary 

angiogram, and to establish with a Cardiologist.





(2) New onset type 2 diabetes mellitus


He reports that DM runs in the family. He was started on a carb controlled diet,

Metformin and sliding scale Regular Insulin coverage. He was seen in consult by 

the Dietician for DM and CHF teaching.





(3) Hypertension


He presented with BP of 161/105 and may have had longstanding HTN that was 

uncontrolled, leading to cardiomyopathy.  The new CHF meds were controlling his 

BP without orthostasis.





(4) Hypokalemia


Low Potassium of 3.3 developed after diuresis and was replaced





(5) Hypomagnesemia


Low Mg of 1.5 developed after diuresis and was replaced





(5) Black toe


The RN noted this, since the patient had no complaints. He said it turned black 

about 1 week ago after some possible foot trauma and was not painful, he cut the

nail down so it wouldn't snag. He underwent Duplex arterial Doppler of L leg, 

which did not show PVD. The Echo had not shown an intracardiac clot.





(6) Microcytic anemia


His Hgb was stable at 12 with very low MCV of 75. Serum levels of iron and 

folate were borderline low.








- ALLERGIES


Allergies/Adverse Reactions: 


                                    Allergies











Allergy/AdvReac Type Severity Reaction Status Date / Time


 


No Known Drug Allergies Allergy   Verified 11/18/19 10:50














- MEDICATIONS


Home Medications: 


                                Ambulatory Orders











 Medication  Instructions  Recorded  Confirmed


 


No Known Home Medications  11/18/19 11/18/19














- PHYSICAL EXAM AT DISCHARGE


General Appearance: positive: No acute distress, Alert


Eyes Bilateral: positive: Normal inspection, EOMI


ENT: positive: ENT inspection nml, No signs of dehydration


Neck: positive: Nml inspection, No JVD


Respiratory: positive: No respiratory distress, Breath sounds nml


Cardiovascular: positive: Regular rate & rhythm, No murmur


Abdomen: positive: Non-tender, No distention


Skin: positive: Color nml


Extremities: positive: No pedal edema


Neurologic/Psychiatric: positive: Oriented x3, Other (Non-focal)





- LABS


Result Diagrams: 


                                 11/20/19 04:55





                                 11/20/19 04:55





- DIAGNOSTIC IMAGING


Diagnostic Imaging Results: Final report reviewed





- FOLLOW UP


Follow Up: 





The transitions RN was able to arrange for a new PCP visit at the clinic on 

[address] on Thurs Dec 19, 2019.

## 2019-11-20 NOTE — DISCHARGE PLAN
Discharge Plan


Problem Reviewed?: Yes


Disposition: 02 Transfer Acute Care Hosp


Condition: Stable


Instruction Topics:  Carvedilol tablets, Diabetes Type 2


No Smoking: If you smoke, Please STOP!  Call [phone] for help.

## 2019-12-11 ENCOUNTER — HOSPITAL ENCOUNTER (OUTPATIENT)
Dept: HOSPITAL 76 - LAB.N | Age: 59
Discharge: HOME | End: 2019-12-11
Attending: FAMILY MEDICINE
Payer: MEDICAID

## 2019-12-11 DIAGNOSIS — I50.9: Primary | ICD-10-CM

## 2019-12-11 LAB
ANION GAP SERPL CALCULATED.4IONS-SCNC: 6 MMOL/L (ref 6–13)
BUN SERPL-MCNC: 13 MG/DL (ref 6–20)
CALCIUM UR-MCNC: 8.9 MG/DL (ref 8.5–10.3)
CHLORIDE SERPL-SCNC: 103 MMOL/L (ref 101–111)
CO2 SERPL-SCNC: 30 MMOL/L (ref 21–32)
CREAT SERPLBLD-SCNC: 0.8 MG/DL (ref 0.6–1.2)
ERYTHROCYTE [DISTWIDTH] IN BLOOD BY AUTOMATED COUNT: 15.5 % (ref 12–15)
GFRSERPLBLD MDRD-ARVRAT: 121 ML/MIN/{1.73_M2} (ref 89–?)
GLUCOSE SERPL-MCNC: 79 MG/DL (ref 70–100)
HGB UR QL STRIP: 13.3 G/DL (ref 14–18)
MCH RBC QN AUTO: 23.1 PG (ref 27–31)
MCHC RBC AUTO-ENTMCNC: 29.9 G/DL (ref 32–36)
MCV RBC AUTO: 77.1 FL (ref 80–94)
NEUTROPHILS # SNV AUTO: 4.9 X10^3/UL (ref 4.8–10.8)
PDW BLD AUTO: 10.7 FL (ref 7.4–11.4)
PLATELET # BLD: 273 10^3/UL (ref 130–450)
RBC MAR: 5.77 10^6/UL (ref 4.7–6.1)
SODIUM SERPLBLD-SCNC: 139 MMOL/L (ref 135–145)

## 2019-12-11 PROCEDURE — 80048 BASIC METABOLIC PNL TOTAL CA: CPT

## 2019-12-11 PROCEDURE — 85027 COMPLETE CBC AUTOMATED: CPT

## 2019-12-11 PROCEDURE — 83880 ASSAY OF NATRIURETIC PEPTIDE: CPT

## 2019-12-11 PROCEDURE — 36415 COLL VENOUS BLD VENIPUNCTURE: CPT

## 2020-01-10 ENCOUNTER — HOSPITAL ENCOUNTER (OUTPATIENT)
Dept: HOSPITAL 76 - LAB.N | Age: 60
Discharge: HOME | End: 2020-01-10
Attending: FAMILY MEDICINE
Payer: MEDICAID

## 2020-01-10 DIAGNOSIS — I50.9: Primary | ICD-10-CM

## 2020-01-10 LAB
ANION GAP SERPL CALCULATED.4IONS-SCNC: 7 MMOL/L (ref 6–13)
BUN SERPL-MCNC: 16 MG/DL (ref 6–20)
CALCIUM UR-MCNC: 9.6 MG/DL (ref 8.5–10.3)
CHLORIDE SERPL-SCNC: 99 MMOL/L (ref 101–111)
CO2 SERPL-SCNC: 28 MMOL/L (ref 21–32)
CREAT SERPLBLD-SCNC: 0.8 MG/DL (ref 0.6–1.2)
ERYTHROCYTE [DISTWIDTH] IN BLOOD BY AUTOMATED COUNT: 14.8 % (ref 12–15)
GFRSERPLBLD MDRD-ARVRAT: 121 ML/MIN/{1.73_M2} (ref 89–?)
GLUCOSE SERPL-MCNC: 151 MG/DL (ref 70–100)
HGB UR QL STRIP: 13.2 G/DL (ref 14–18)
MCH RBC QN AUTO: 23 PG (ref 27–31)
MCHC RBC AUTO-ENTMCNC: 30.6 G/DL (ref 32–36)
MCV RBC AUTO: 75.4 FL (ref 80–94)
NEUTROPHILS # SNV AUTO: 4 X10^3/UL (ref 4.8–10.8)
PDW BLD AUTO: 10.7 FL (ref 7.4–11.4)
PLATELET # BLD: 227 10^3/UL (ref 130–450)
RBC MAR: 5.73 10^6/UL (ref 4.7–6.1)
SODIUM SERPLBLD-SCNC: 134 MMOL/L (ref 135–145)

## 2020-01-10 PROCEDURE — 36415 COLL VENOUS BLD VENIPUNCTURE: CPT

## 2020-01-10 PROCEDURE — 85027 COMPLETE CBC AUTOMATED: CPT

## 2020-01-10 PROCEDURE — 83880 ASSAY OF NATRIURETIC PEPTIDE: CPT

## 2020-01-10 PROCEDURE — 80048 BASIC METABOLIC PNL TOTAL CA: CPT

## 2020-03-06 ENCOUNTER — HOSPITAL ENCOUNTER (OUTPATIENT)
Dept: HOSPITAL 76 - LAB.N | Age: 60
Discharge: HOME | End: 2020-03-06
Attending: INTERNAL MEDICINE
Payer: MEDICAID

## 2020-03-06 DIAGNOSIS — I50.22: Primary | ICD-10-CM

## 2020-03-06 LAB
ANION GAP SERPL CALCULATED.4IONS-SCNC: 8 MMOL/L (ref 6–13)
BUN SERPL-MCNC: 18 MG/DL (ref 6–20)
CALCIUM UR-MCNC: 9 MG/DL (ref 8.5–10.3)
CHLORIDE SERPL-SCNC: 102 MMOL/L (ref 101–111)
CO2 SERPL-SCNC: 27 MMOL/L (ref 21–32)
CREAT SERPLBLD-SCNC: 0.9 MG/DL (ref 0.6–1.2)
GFRSERPLBLD MDRD-ARVRAT: 105 ML/MIN/{1.73_M2} (ref 89–?)
GLUCOSE SERPL-MCNC: 96 MG/DL (ref 70–100)
SODIUM SERPLBLD-SCNC: 137 MMOL/L (ref 135–145)

## 2020-03-06 PROCEDURE — 80048 BASIC METABOLIC PNL TOTAL CA: CPT

## 2020-03-06 PROCEDURE — 36415 COLL VENOUS BLD VENIPUNCTURE: CPT

## 2020-03-27 ENCOUNTER — HOSPITAL ENCOUNTER (OUTPATIENT)
Dept: HOSPITAL 76 - LAB.N | Age: 60
Discharge: HOME | End: 2020-03-27
Attending: NURSE PRACTITIONER
Payer: MEDICAID

## 2020-03-27 DIAGNOSIS — I50.22: Primary | ICD-10-CM

## 2020-03-27 LAB
ANION GAP SERPL CALCULATED.4IONS-SCNC: 9 MMOL/L (ref 6–13)
BUN SERPL-MCNC: 21 MG/DL (ref 6–20)
CALCIUM UR-MCNC: 9.5 MG/DL (ref 8.5–10.3)
CHLORIDE SERPL-SCNC: 100 MMOL/L (ref 101–111)
CO2 SERPL-SCNC: 28 MMOL/L (ref 21–32)
CREAT SERPLBLD-SCNC: 0.9 MG/DL (ref 0.6–1.2)
GLUCOSE SERPL-MCNC: 132 MG/DL (ref 70–100)
SODIUM SERPLBLD-SCNC: 137 MMOL/L (ref 135–145)

## 2020-03-27 PROCEDURE — 80048 BASIC METABOLIC PNL TOTAL CA: CPT

## 2020-03-27 PROCEDURE — 36415 COLL VENOUS BLD VENIPUNCTURE: CPT

## 2020-05-12 ENCOUNTER — HOSPITAL ENCOUNTER (OUTPATIENT)
Dept: HOSPITAL 76 - LAB.WCP | Age: 60
Discharge: HOME | End: 2020-05-12
Attending: NURSE PRACTITIONER
Payer: MEDICAID

## 2020-05-12 DIAGNOSIS — I10: ICD-10-CM

## 2020-05-12 DIAGNOSIS — I42.0: Primary | ICD-10-CM

## 2020-05-12 LAB
ANION GAP SERPL CALCULATED.4IONS-SCNC: 11 MMOL/L (ref 6–13)
BUN SERPL-MCNC: 27 MG/DL (ref 6–20)
CALCIUM UR-MCNC: 9.3 MG/DL (ref 8.5–10.3)
CHLORIDE SERPL-SCNC: 97 MMOL/L (ref 101–111)
CO2 SERPL-SCNC: 26 MMOL/L (ref 21–32)
CREAT SERPLBLD-SCNC: 1.1 MG/DL (ref 0.6–1.2)
GLUCOSE SERPL-MCNC: 146 MG/DL (ref 70–100)
SODIUM SERPLBLD-SCNC: 134 MMOL/L (ref 135–145)

## 2020-05-12 PROCEDURE — 36415 COLL VENOUS BLD VENIPUNCTURE: CPT

## 2020-05-12 PROCEDURE — 80048 BASIC METABOLIC PNL TOTAL CA: CPT

## 2020-09-18 ENCOUNTER — HOSPITAL ENCOUNTER (OUTPATIENT)
Dept: HOSPITAL 76 - LAB.WCP | Age: 60
Discharge: HOME | End: 2020-09-18
Attending: FAMILY MEDICINE
Payer: MEDICAID

## 2020-09-18 DIAGNOSIS — E11.9: ICD-10-CM

## 2020-09-18 DIAGNOSIS — I10: Primary | ICD-10-CM

## 2020-09-18 LAB
ALBUMIN DIAFP-MCNC: 4.3 G/DL (ref 3.2–5.5)
ALBUMIN/GLOB SERPL: 1.4 {RATIO} (ref 1–2.2)
ALP SERPL-CCNC: 66 IU/L (ref 42–121)
ALT SERPL W P-5'-P-CCNC: 13 IU/L (ref 10–60)
ANION GAP SERPL CALCULATED.4IONS-SCNC: 9 MMOL/L (ref 6–13)
AST SERPL W P-5'-P-CCNC: 16 IU/L (ref 10–42)
BASOPHILS NFR BLD AUTO: 0 10^3/UL (ref 0–0.1)
BASOPHILS NFR BLD AUTO: 0.8 %
BILIRUB BLD-MCNC: 1.5 MG/DL (ref 0.2–1)
BUN SERPL-MCNC: 16 MG/DL (ref 6–20)
CALCIUM UR-MCNC: 9.3 MG/DL (ref 8.5–10.3)
CHLORIDE SERPL-SCNC: 98 MMOL/L (ref 101–111)
CHOLEST SERPL-MCNC: 90 MG/DL
CO2 SERPL-SCNC: 28 MMOL/L (ref 21–32)
CREAT SERPLBLD-SCNC: 1.2 MG/DL (ref 0.6–1.2)
CREAT UR-SCNC: 91.6 MG/DL
EOSINOPHIL # BLD AUTO: 0.3 10^3/UL (ref 0–0.7)
EOSINOPHIL NFR BLD AUTO: 5 %
ERYTHROCYTE [DISTWIDTH] IN BLOOD BY AUTOMATED COUNT: 14.4 % (ref 12–15)
GLOBULIN SER-MCNC: 3.1 G/DL (ref 2.1–4.2)
GLUCOSE SERPL-MCNC: 170 MG/DL (ref 70–100)
HBA1C MFR BLD HPLC: 8.3 % (ref 4.27–6.07)
HDLC SERPL-MCNC: 33 MG/DL
HDLC SERPL: 2.7 {RATIO} (ref ?–5)
HGB UR QL STRIP: 11.1 G/DL (ref 14–18)
LDLC SERPL CALC-MCNC: 31 MG/DL
LDLC/HDLC SERPL: 0.9 {RATIO} (ref ?–3.6)
LYMPHOCYTES # SPEC AUTO: 1.4 10^3/UL (ref 1.5–3.5)
LYMPHOCYTES NFR BLD AUTO: 26 %
MCH RBC QN AUTO: 23.6 PG (ref 27–31)
MCHC RBC AUTO-ENTMCNC: 30.7 G/DL (ref 32–36)
MCV RBC AUTO: 76.9 FL (ref 80–94)
MICROALBUMIN UR-MCNC: 0.2 MG/DL (ref 0–300)
MICROALBUMIN/CREAT RATIO PNL UR: 2.2 UG/MG (ref ?–30)
MONOCYTES # BLD AUTO: 0.3 10^3/UL (ref 0–1)
MONOCYTES NFR BLD AUTO: 5.9 %
NEUTROPHILS # BLD AUTO: 3.3 10^3/UL (ref 1.5–6.6)
NEUTROPHILS # SNV AUTO: 5.2 X10^3/UL (ref 4.8–10.8)
NEUTROPHILS NFR BLD AUTO: 61.9 %
PDW BLD AUTO: 10.8 FL (ref 7.4–11.4)
PLATELET # BLD: 244 10^3/UL (ref 130–450)
PROT SPEC-MCNC: 7.4 G/DL (ref 6.7–8.2)
RBC MAR: 4.71 10^6/UL (ref 4.7–6.1)
SODIUM SERPLBLD-SCNC: 135 MMOL/L (ref 135–145)
VLDLC SERPL-SCNC: 26 MG/DL

## 2020-09-18 PROCEDURE — 83721 ASSAY OF BLOOD LIPOPROTEIN: CPT

## 2020-09-18 PROCEDURE — 82570 ASSAY OF URINE CREATININE: CPT

## 2020-09-18 PROCEDURE — 83036 HEMOGLOBIN GLYCOSYLATED A1C: CPT

## 2020-09-18 PROCEDURE — 85025 COMPLETE CBC W/AUTO DIFF WBC: CPT

## 2020-09-18 PROCEDURE — 80061 LIPID PANEL: CPT

## 2020-09-18 PROCEDURE — 36415 COLL VENOUS BLD VENIPUNCTURE: CPT

## 2020-09-18 PROCEDURE — 82043 UR ALBUMIN QUANTITATIVE: CPT

## 2020-09-18 PROCEDURE — 80053 COMPREHEN METABOLIC PANEL: CPT

## 2020-09-18 PROCEDURE — 84443 ASSAY THYROID STIM HORMONE: CPT

## 2020-10-04 ENCOUNTER — HOSPITAL ENCOUNTER (OUTPATIENT)
Dept: HOSPITAL 76 - LAB.R | Age: 60
Discharge: HOME | End: 2020-10-04
Attending: FAMILY MEDICINE
Payer: MEDICAID

## 2020-10-04 DIAGNOSIS — D64.9: Primary | ICD-10-CM

## 2020-10-04 PROCEDURE — 82274 ASSAY TEST FOR BLOOD FECAL: CPT

## 2021-02-03 ENCOUNTER — HOSPITAL ENCOUNTER (INPATIENT)
Dept: HOSPITAL 76 - ED | Age: 61
LOS: 2 days | Discharge: HOME | DRG: 683 | End: 2021-02-05
Attending: SPECIALIST | Admitting: SPECIALIST
Payer: MEDICAID

## 2021-02-03 DIAGNOSIS — R35.1: ICD-10-CM

## 2021-02-03 DIAGNOSIS — Z20.822: ICD-10-CM

## 2021-02-03 DIAGNOSIS — Z79.899: ICD-10-CM

## 2021-02-03 DIAGNOSIS — Z79.84: ICD-10-CM

## 2021-02-03 DIAGNOSIS — N17.9: Primary | ICD-10-CM

## 2021-02-03 DIAGNOSIS — K08.409: ICD-10-CM

## 2021-02-03 DIAGNOSIS — K03.81: ICD-10-CM

## 2021-02-03 DIAGNOSIS — E86.0: ICD-10-CM

## 2021-02-03 DIAGNOSIS — I25.2: ICD-10-CM

## 2021-02-03 DIAGNOSIS — E11.40: ICD-10-CM

## 2021-02-03 DIAGNOSIS — I42.0: ICD-10-CM

## 2021-02-03 DIAGNOSIS — R11.2: ICD-10-CM

## 2021-02-03 DIAGNOSIS — Z91.19: ICD-10-CM

## 2021-02-03 DIAGNOSIS — T38.3X5A: ICD-10-CM

## 2021-02-03 DIAGNOSIS — E11.319: ICD-10-CM

## 2021-02-03 DIAGNOSIS — I50.22: ICD-10-CM

## 2021-02-03 DIAGNOSIS — I11.0: ICD-10-CM

## 2021-02-03 LAB
ALBUMIN DIAFP-MCNC: 4.2 G/DL (ref 3.2–5.5)
ALBUMIN/GLOB SERPL: 1.1 {RATIO} (ref 1–2.2)
ALP SERPL-CCNC: 67 IU/L (ref 42–121)
ALT SERPL W P-5'-P-CCNC: 16 IU/L (ref 10–60)
ANION GAP SERPL CALCULATED.4IONS-SCNC: 15 MMOL/L (ref 6–13)
ANION GAP SERPL CALCULATED.4IONS-SCNC: 17 MMOL/L (ref 6–13)
AST SERPL W P-5'-P-CCNC: 18 IU/L (ref 10–42)
BASOPHILS NFR BLD AUTO: 0 10^3/UL (ref 0–0.1)
BASOPHILS NFR BLD AUTO: 0.2 %
BILIRUB BLD-MCNC: 0.7 MG/DL (ref 0.2–1)
BUN SERPL-MCNC: 75 MG/DL (ref 6–20)
BUN SERPL-MCNC: 79 MG/DL (ref 6–20)
C PNEUM DNA NPH QL NAA+NON-PROBE: NOT DETECTED
CALCIUM UR-MCNC: 8.2 MG/DL (ref 8.5–10.3)
CALCIUM UR-MCNC: 8.8 MG/DL (ref 8.5–10.3)
CHLORIDE SERPL-SCNC: 92 MMOL/L (ref 101–111)
CHLORIDE SERPL-SCNC: 95 MMOL/L (ref 101–111)
CLARITY UR REFRACT.AUTO: CLEAR
CO2 SERPL-SCNC: 22 MMOL/L (ref 21–32)
CO2 SERPL-SCNC: 24 MMOL/L (ref 21–32)
CREAT SERPLBLD-SCNC: 4.8 MG/DL (ref 0.6–1.2)
CREAT SERPLBLD-SCNC: 5.6 MG/DL (ref 0.6–1.2)
EOSINOPHIL # BLD AUTO: 0.1 10^3/UL (ref 0–0.7)
EOSINOPHIL NFR BLD AUTO: 0.9 %
ERYTHROCYTE [DISTWIDTH] IN BLOOD BY AUTOMATED COUNT: 13.9 % (ref 12–15)
GLOBULIN SER-MCNC: 3.8 G/DL (ref 2.1–4.2)
GLUCOSE SERPL-MCNC: 106 MG/DL (ref 70–100)
GLUCOSE SERPL-MCNC: 69 MG/DL (ref 70–100)
GLUCOSE UR QL STRIP.AUTO: 250 MG/DL
HBA1C MFR BLD HPLC: 7.4 % (ref 4.27–6.07)
HGB UR QL STRIP: 11.5 G/DL (ref 14–18)
KETONES UR QL STRIP.AUTO: NEGATIVE MG/DL
LIPASE SERPL-CCNC: 53 U/L (ref 22–51)
LYMPHOCYTES # SPEC AUTO: 0.9 10^3/UL (ref 1.5–3.5)
LYMPHOCYTES NFR BLD AUTO: 15.9 %
MAGNESIUM SERPL-MCNC: 2.2 MG/DL (ref 1.7–2.8)
MCH RBC QN AUTO: 23.3 PG (ref 27–31)
MCHC RBC AUTO-ENTMCNC: 31 G/DL (ref 32–36)
MCV RBC AUTO: 75.3 FL (ref 80–94)
MONOCYTES # BLD AUTO: 0.5 10^3/UL (ref 0–1)
MONOCYTES NFR BLD AUTO: 8.3 %
NEUTROPHILS # BLD AUTO: 4.3 10^3/UL (ref 1.5–6.6)
NEUTROPHILS # SNV AUTO: 5.8 X10^3/UL (ref 4.8–10.8)
NEUTROPHILS NFR BLD AUTO: 74.4 %
NITRITE UR QL STRIP.AUTO: NEGATIVE
PDW BLD AUTO: 10.2 FL (ref 7.4–11.4)
PH UR STRIP.AUTO: 5.5 PH (ref 5–7.5)
PLATELET # BLD: 287 10^3/UL (ref 130–450)
PROT SPEC-MCNC: 8 G/DL (ref 6.7–8.2)
PROT UR STRIP.AUTO-MCNC: NEGATIVE MG/DL
RBC # UR STRIP.AUTO: NEGATIVE /UL
RBC MAR: 4.93 10^6/UL (ref 4.7–6.1)
SP GR UR STRIP.AUTO: 1.02 (ref 1–1.03)
UROBILINOGEN UR QL STRIP.AUTO: (no result) E.U./DL
UROBILINOGEN UR STRIP.AUTO-MCNC: NEGATIVE MG/DL

## 2021-02-03 PROCEDURE — 85025 COMPLETE CBC W/AUTO DIFF WBC: CPT

## 2021-02-03 PROCEDURE — 80048 BASIC METABOLIC PNL TOTAL CA: CPT

## 2021-02-03 PROCEDURE — 51798 US URINE CAPACITY MEASURE: CPT

## 2021-02-03 PROCEDURE — 99284 EMERGENCY DEPT VISIT MOD MDM: CPT

## 2021-02-03 PROCEDURE — 83605 ASSAY OF LACTIC ACID: CPT

## 2021-02-03 PROCEDURE — 0202U NFCT DS 22 TRGT SARS-COV-2: CPT

## 2021-02-03 PROCEDURE — 87086 URINE CULTURE/COLONY COUNT: CPT

## 2021-02-03 PROCEDURE — 96361 HYDRATE IV INFUSION ADD-ON: CPT

## 2021-02-03 PROCEDURE — 83036 HEMOGLOBIN GLYCOSYLATED A1C: CPT

## 2021-02-03 PROCEDURE — 81003 URINALYSIS AUTO W/O SCOPE: CPT

## 2021-02-03 PROCEDURE — 96374 THER/PROPH/DIAG INJ IV PUSH: CPT

## 2021-02-03 PROCEDURE — 81001 URINALYSIS AUTO W/SCOPE: CPT

## 2021-02-03 PROCEDURE — 80053 COMPREHEN METABOLIC PANEL: CPT

## 2021-02-03 PROCEDURE — 83735 ASSAY OF MAGNESIUM: CPT

## 2021-02-03 PROCEDURE — 36415 COLL VENOUS BLD VENIPUNCTURE: CPT

## 2021-02-03 PROCEDURE — 83690 ASSAY OF LIPASE: CPT

## 2021-02-03 PROCEDURE — 71045 X-RAY EXAM CHEST 1 VIEW: CPT

## 2021-02-03 PROCEDURE — 99285 EMERGENCY DEPT VISIT HI MDM: CPT

## 2021-02-03 RX ADMIN — INSULIN ASPART SCH: 100 INJECTION, SOLUTION INTRAVENOUS; SUBCUTANEOUS at 17:23

## 2021-02-03 RX ADMIN — SODIUM CHLORIDE, PRESERVATIVE FREE SCH: 5 INJECTION INTRAVENOUS at 23:44

## 2021-02-03 RX ADMIN — SODIUM CHLORIDE, POTASSIUM CHLORIDE, SODIUM LACTATE AND CALCIUM CHLORIDE SCH MLS/HR: 600; 310; 30; 20 INJECTION, SOLUTION INTRAVENOUS at 21:05

## 2021-02-03 RX ADMIN — SODIUM CHLORIDE, POTASSIUM CHLORIDE, SODIUM LACTATE AND CALCIUM CHLORIDE SCH MLS/HR: 600; 310; 30; 20 INJECTION, SOLUTION INTRAVENOUS at 15:48

## 2021-02-03 RX ADMIN — INSULIN ASPART SCH: 100 INJECTION, SOLUTION INTRAVENOUS; SUBCUTANEOUS at 20:52

## 2021-02-03 RX ADMIN — SODIUM CHLORIDE, PRESERVATIVE FREE SCH: 5 INJECTION INTRAVENOUS at 17:23

## 2021-02-03 NOTE — PHARMACY PROGRESS NOTE
- Best Possible Medication History


Admit Date and Time: 02/03/21 1356


Processed by: Pharmacy


Medication History completed: Yes


Patient Interview: Completed


Secondary Source(s): Physician records (PATIENT INTERVIEWED BY PHARMACY. PATIENT

ABLE TO CONFIRM HOME MEDICATIONS ), Pharmacy records, Insurance records





As the person ultimately responsible for medication therapy, providers are able 

to order a medication from an existing home medication list in [facility] via the 

"Reconcile Routine" prior to Confirmation of that medication by support staff. 

Such practice is discouraged except when the physician, in their clinical 

judgment, deems that a medical need exists for a medication without regard to 

previous use.

## 2021-02-03 NOTE — ED PHYSICIAN DOCUMENTATION
PD HPI NVD





- Stated complaint


Stated Complaint: DIZZY,NAUSEA





- Chief complaint


Chief Complaint: General





- History obtained from


History obtained from: Patient





- History of Present Illness


Timing - onset: How many days ago (2-3)


Timing - duration: Days (2-3)


Timing - details: Abrupt onset


Associated symptoms: Other (He has noticed loss of appetite and nausea 

associated with general weakness and decreased urine output for the last 2 to 3 

days.  Symptoms worse today.).  No: Fever, Abdominal pain, Chest pain


Contributing factors: Diabetes, Other (new medication).  No: Sick contact, Bad 

food, Recent antibiotics


Improved by: No: Vomiting


Worsened by: Eating


Recently seen: Clinic (Started on Jardiance as a new medicine for his diabetes a

week ago.  Other medicines stayed the same.)





Review of Systems


Constitutional: denies: Fever, Chills


Nose: denies: Rhinorrhea / runny nose, Congestion


Throat: denies: Sore throat


Cardiac: denies: Chest pain / pressure


Respiratory: reports: Dyspnea.  denies: Cough, Wheezing


GI: reports: Nausea, Vomiting (couple of times).  denies: Abdominal Pain, 

Diarrhea


: reports: Hesitancy (decreased urine output and dark urine, per patient.).  

denies: Dysuria


Skin: denies: Rash, Lesions


Neurologic: reports: Generalized weakness.  denies: Focal weakness, Numbness, 

Near syncope (but is feeling lightheaded without vertigo), Confused, Altered 

mental status, Headache


Psychiatric: denies: Depressed


Endocrine: denies: Weight loss





PD PAST MEDICAL HISTORY





- Past Medical History


Cardiovascular: Congestive heart failure, Hypertension


Respiratory: None


Neuro: None


Endocrine/Autoimmune: Type 2 diabetes





- Past Surgical History


Past Surgical History: No





- Present Medications


Home Medications: 


                                Ambulatory Orders











 Medication  Instructions  Recorded  Confirmed


 


Aspirin [Aspirin EC] 81 mg PO DAILY 02/06/20 02/03/21


 


Atorvastatin Calcium 40 mg PO DAILY 02/06/20 02/03/21


 


Carvedilol 6.25 mg PO BID 02/06/20 02/03/21


 


Lisinopril [Prinivil] 10 mg PO DAILY 02/06/20 02/03/21


 


Metformin HCl 500 mg PO BID 02/06/20 02/03/21


 


Spironolactone 25 mg PO DAILY 02/06/20 02/03/21


 


glipiZIDE [Glucotrol] 2.5 mg PO DAILY 02/06/20 02/03/21


 


Empagliflozin [Jardiance] 10 mg PO DAILY 02/03/21 02/03/21


 


Sildenafil Citrate [Sildenafil] 20 mg PO PRN PRN 02/03/21 02/03/21














- Allergies


Allergies/Adverse Reactions: 


                                    Allergies











Allergy/AdvReac Type Severity Reaction Status Date / Time


 


No Known Drug Allergies Allergy   Verified 02/03/21 10:23














- Social History


Does the pt smoke?: No


Smoking Status: Never smoker


Does the pt drink ETOH?: Yes


Does the pt have substance abuse?: No





- POLST


Patient has POLST: No





PD ED PE NORMAL





- Vitals


Vital signs reviewed: Yes





- General


General: Alert and oriented X 3, Well developed/nourished





- HEENT


HEENT: Ears normal, Pharynx benign.  No: Moist mucous membranes





- Neck


Neck: Supple, no meningeal sign, No adenopathy





- Cardiac


Cardiac: RRR, No murmur





- Respiratory


Respiratory: Clear bilaterally





- Abdomen


Abdomen: Soft, Non tender, Non distended, No organomegaly, Other (mild 

suprapubic fullness.).  No: Normal bowel sounds (decreased)





- Male 


Male : Deferred





- Rectal


Rectal: Deferred





- Back


Back: No CVA TTP





- Derm


Derm: Warm and dry.  No: Normal color (somewhat pale)





- Extremities


Extremities: Normal ROM s pain, No edema, No calf tenderness / cord





- Neuro


Neuro: Alert and oriented X 3, No motor deficit, Normal speech


Eye Opening: Spontaneous


Motor: Obeys Commands


Verbal: Oriented


GCS Score: 15





- Psych


Psych: Normal mood





Results





- Vitals


Vitals: 


                               Vital Signs - 24 hr











  02/03/21 02/03/21





  10:23 11:03


 


Temperature 35.9 C L 36.6 C


 


Heart Rate 78 73


 


Respiratory 18 14





Rate  


 


Blood Pressure 128/75 110/75


 


O2 Saturation 97 100








                                     Oxygen











O2 Source                      Room air

















- Labs


Labs: 


                                Laboratory Tests











  02/03/21 02/03/21 02/03/21





  10:40 10:40 10:41


 


WBC  5.8  


 


RBC  4.93  


 


Hgb  11.5 L  


 


Hct  37.1 L  


 


MCV  75.3 L  


 


MCH  23.3 L  


 


MCHC  31.0 L  


 


RDW  13.9  


 


Plt Count  287  


 


MPV  10.2  


 


Neut # (Auto)  4.3  


 


Lymph # (Auto)  0.9 L  


 


Mono # (Auto)  0.5  


 


Eos # (Auto)  0.1  


 


Baso # (Auto)  0.0  


 


Absolute Nucleated RBC  0.00  


 


Nucleated RBC %  0.0  


 


Sodium   133 L 


 


Potassium   4.1 


 


Chloride   92 L 


 


Carbon Dioxide   24 


 


Anion Gap   17.0 H 


 


BUN   79 H 


 


Creatinine   5.6 H 


 


Estimated GFR (MDRD)   13 L 


 


Glucose   69 L 


 


POC Whole Bld Glucose    63 L


 


Lactic Acid   


 


Calcium   8.8 


 


Magnesium   2.2 


 


Total Bilirubin   0.7 


 


AST   18 


 


ALT   16 


 


Alkaline Phosphatase   67 


 


Total Protein   8.0 


 


Albumin   4.2 


 


Globulin   3.8 


 


Albumin/Globulin Ratio   1.1 


 


Lipase   53 H 


 


Nasal Adenovirus (PCR)   


 


Nasal B. parapertussis DNA (PCR)   


 


Nasal Coronavir 229E PCR   


 


Nasal Coronavir HKU1 PCR   


 


Nasal Coronavir NL63 PCR   


 


Nasal Coronavir OC43 PCR   


 


Nasal Enterovir/Rhinovir PCR   


 


Nasal Influenza B PCR   


 


Nasal Influenza A PCR   


 


Nasal Parainfluen 1 PCR   


 


Nasal Parainfluen 2 PCR   


 


Nasal Parainfluen 3 PCR   


 


Nasal Parainfluen 4 PCR   


 


Nasal RSV (PCR)   


 


Nasal B.pertussis DNA PCR   


 


Nasal C.pneumoniae (PCR)   


 


Godfrey Human Metapneumo PCR   


 


Nasal M.pneumoniae (PCR)   


 


Nasal SARS-CoV-2 (PCR)   














  02/03/21 02/03/21 02/03/21





  11:20 13:10 13:10


 


WBC   


 


RBC   


 


Hgb   


 


Hct   


 


MCV   


 


MCH   


 


MCHC   


 


RDW   


 


Plt Count   


 


MPV   


 


Neut # (Auto)   


 


Lymph # (Auto)   


 


Mono # (Auto)   


 


Eos # (Auto)   


 


Baso # (Auto)   


 


Absolute Nucleated RBC   


 


Nucleated RBC %   


 


Sodium   132 L 


 


Potassium   4.4 


 


Chloride   95 L 


 


Carbon Dioxide   22 


 


Anion Gap   15.0 H 


 


BUN   75 H 


 


Creatinine   4.8 H 


 


Estimated GFR (MDRD)   15 L 


 


Glucose   106 H 


 


POC Whole Bld Glucose   


 


Lactic Acid    1.0


 


Calcium   8.2 L 


 


Magnesium   


 


Total Bilirubin   


 


AST   


 


ALT   


 


Alkaline Phosphatase   


 


Total Protein   


 


Albumin   


 


Globulin   


 


Albumin/Globulin Ratio   


 


Lipase   


 


Nasal Adenovirus (PCR)  NOT DETECTED  


 


Nasal B. parapertussis DNA (PCR)  NOT DETECTED  


 


Nasal Coronavir 229E PCR  NOT DETECTED  


 


Nasal Coronavir HKU1 PCR  NOT DETECTED  


 


Nasal Coronavir NL63 PCR  NOT DETECTED  


 


Nasal Coronavir OC43 PCR  NOT DETECTED  


 


Nasal Enterovir/Rhinovir PCR  NOT DETECTED  


 


Nasal Influenza B PCR  NOT DETECTED  


 


Nasal Influenza A PCR  NOT DETECTED  


 


Nasal Parainfluen 1 PCR  NOT DETECTED  


 


Nasal Parainfluen 2 PCR  NOT DETECTED  


 


Nasal Parainfluen 3 PCR  NOT DETECTED  


 


Nasal Parainfluen 4 PCR  NOT DETECTED  


 


Nasal RSV (PCR)  NOT DETECTED  


 


Nasal B.pertussis DNA PCR  NOT DETECTED  


 


Nasal C.pneumoniae (PCR)  NOT DETECTED  


 


Godfrey Human Metapneumo PCR  NOT DETECTED  


 


Nasal M.pneumoniae (PCR)  NOT DETECTED  


 


Nasal SARS-CoV-2 (PCR)  NOT DETECTED  














- Rads (name of study)


  ** chest xray


Radiology: Prelim report reviewed (no infiltrates), See rad report





PD MEDICAL DECISION MAKING





- ED course


Complexity details: reviewed results (The timeline is such that it sounds like 

his new Jardiance medicine contributed to acute renal failure compounded by his 

diuretics and ACE inhibitor and then under hydration with nausea.  I presume 

this will be reversible with fluids and holding medicines.), re-evaluated 

patient (The bladder scanner initially showed 350 mils so concern for 

obstructive uropathy.  However he did urinate over 200 mL prior to Fagan 

placement and so this seemed reasonable output.  Fagan was held.), considered 

differential (Given nausea and general malaise with some dyspnea, consider 

possible viral illness including Covid versus pneumonia.  Has had decreased 

urine output so can check a urine for potential infection.  There is some 

fullness in the bladder so check bladder scanner.), d/w patient





Departure





- Departure


Disposition: ED Place in Observation


Clinical Impression: 


 Acute kidney injury, Generalized weakness, Nausea





Condition: Stable


Record reviewed to determine appropriate education?: Yes


Discharge Date/Time: 02/03/21 14:23

## 2021-02-03 NOTE — HISTORY & PHYSICAL EXAMINATION
Chief Complaint





- Chief Complaint


Chief Complaint: nausea and vomiting





History of Present Illness





- Admitted From


Admitted From:: home





- History Obtained From


Records Reviewed: [facility]


History obtained from: Patient and chart review





- History of Present Illness


HPI Comment/Other: 





60 year old male diagnosed with DM2 in November 2019 seen in the ED for 3 days 

of nausea and vomiting. He denies recent sick contacts or change in diet.  

Denies fevers, chills, shortness of breath or fatigue.  Reports he was started 

on a new diabetes medication, Jardiance, by his PCP approximately 2 weeks ago 

(in addition to his previous medications which include Glipizide and Metformin).

 He is supposed to be checking his blood sugars daily but has not been doing so.

 His last A1C was 8.3% September 2020 and he is trying to maintain a 

carbohydrate managed diet.  He takes his medications as prescribed.  In addition

to noting new n/v over the last 3 days, he has had decreased appetite, increased

thirst (drinking multiple bottles of water a day), and decreased urine output.  

He was found to have elevated creatinine to 5.6 in the ED, up from 1.2 in 

September 2020.   Additional labs included a glucose of 69 and lactic acid 1.0. 

He was admitted for acute kidney injury related to dehydration 2/2 Jardiance and

started on IV fluid replacement.





In the ED he received a fluid bolus with good effect on the creatinine, which 

decreased to 4.8, and he was able to urinate appropriately. Regarding diabetic 

complications, he has blurry vision in the right eye as well as cranial 

mononeuropathy III.  His last eye exam was 2 months ago and he was instructed to

see a retinal specialist but has not been able to do so yet.  He has occasional 

numbness and tingling in his hands and feet.  No foot ulcers noted on physical 

exam.





Additionally, Pt has a history of chronic systolic heart failure with dilated 

cardiomyopathy diagnosed in 2019 at the time he was diagnosed with DM2.  At that

time he presented with 3 weeks SOB and intermittent chest pain after having rec

ently used cocaine.  Also with a remote hx of methamphetamines and heroin.  His 

troponins were found to be in the 50s and flat.  An echo showed EF 25-30%.  He 

had pulmonary hypertension with a PA pressure of 60mmHg, with stress test 

negative for reversible defects.  His telemetry showed a 10 beat run of V tach. 

His admit weight at that time was 260 pounds, discharge weight of 196 pounds.  

He has been following up regularly with Cardiology, and in March 2020 was 

started on a 7 day Zio patch.  He takes Carvedilol, Lisinopril and Lasix.  A 

repeat echo in June 2020 revealed LVEF 55%.  He denies current chest pain, SOB 

at rest or on exertion. Remaining ROS negative.





History





- Past Medical History


Cardiovascular: reports: Congestive heart failure (June 2020 LVEF 55%), 

Hypertension, MI


Respiratory: reports: None


Neuro: reports: None, Peripheral neuropathy


Endocrine/Autoimmune: reports: Type 2 diabetes (A1C 8.3% September 2020)


GI: reports: None


: reports: None, Nocturia


HEENT: reports: None, Chronic vision loss (Right eye blurry, likely retinopathy)


Psych: reports: None


Musculoskeletal: reports: None


Derm: reports: None


MRSA Hx?: No





- Family & Social History


Family History Comment/Other: Reports family history of hypertension, diabetes. 

His mother passed away from congestive heart failure. He has 7 brothers and 7 

sisters but is not sure of their medical history.


Living arrangement: At home


Living Situation: With family (lives with his sister)


Social History Notes: He is currently not employed but was previous he working 

in [place].  He does not smoke.  Reports social use of alcohol, very rare.  

Remote hx of cocaine and meth.  Denies heroin use.





- Substance History


Use: Uses substance without health or social issues: NONE


Abuse: Recurrent use of substance despite neg consequences: NONE


Dependence: Experiences withdrawal or developed tolerances: NONE





- POLST


Patient has POLST: No





Meds/Allgy





- Home Medications


Home Medications: 


                                Ambulatory Orders











 Medication  Instructions  Recorded  Confirmed


 


Aspirin [Aspirin EC] 81 mg PO DAILY 02/06/20 02/03/21


 


Atorvastatin Calcium 40 mg PO DAILY 02/06/20 02/03/21


 


Carvedilol 6.25 mg PO BID 02/06/20 02/03/21


 


Lisinopril [Prinivil] 10 mg PO DAILY 02/06/20 02/03/21


 


Metformin HCl 500 mg PO BID 02/06/20 02/03/21


 


Spironolactone 25 mg PO DAILY 02/06/20 02/03/21


 


glipiZIDE [Glucotrol] 2.5 mg PO DAILY 02/06/20 02/03/21


 


Empagliflozin [Jardiance] 10 mg PO DAILY 02/03/21 02/03/21


 


Furosemide [Lasix] 20 mg PO DAILY 02/03/21 


 


Sildenafil Citrate [Sildenafil] 20 mg PO PRN PRN 02/03/21 02/03/21














- Allergies


Allergies/Adverse Reactions: 


                                    Allergies











Allergy/AdvReac Type Severity Reaction Status Date / Time


 


No Known Drug Allergies Allergy   Verified 02/03/21 10:23














Review of Systems





- Constitutional


Constitutional: reports: Poor appetite.  denies: Fatigue, Fever, Chills, Weakne

ss





- Eyes


Eyes: reports: Blurred vision (R eye), Vision loss (R eye).  denies: Pain, 

Irritation





- Ears, Nose & Throat


Ears, Nose & Throat: reports: Dental decay (tooth pain).  denies: Ear pain, 

Hearing loss, Nasal discharge, Nasal congestion, Postnasal drainage





- Cardiovascular


Cariovascular: denies: Palpitations, Chest pain, Edema, Lightheadedness, 

Exertional dyspnea





- Respiratory


Respiratory: denies: Cough, Orthopnea, SOB at rest, SOB with exertion





- Gastrointestinal


Gastrointestinal: denies: Abdominal pain, Abdominal distention, Diarrhea





- Genitourinary


Genitourinary: reports: Nocturia.  denies: Dysuria, Frequency





- Musculoskeletal


Musculoskeletal: denies: Muscle pain, Back pain, Limited range of motion, Muscle

 weakness





- Integumentary


Integumentary: denies: Rash, Lesions





- Neurological


Neurological: reports: Numbness (occasional numbness to feet and hands).  

denies: General weakness, Headache, Dizziness





- Psychiatric


Psychiatric: denies: Depression





- Endocrine


Endocrine: reports: Polydypsia (drinking "a lot of bottled water" over the last 

few days)





- Hematologic/Lymphatic


Hematologic/Lymphatic: denies: Anemia, Bruising, Petechiae





- All Other Systems


All Other Systems: reports: Reviewed and negative


Prior Level of Functionality: 





Independent with ADLs.  Drives himself to appointments as he is able to see 

"fine" with both eyes, just more blurry on the right.





Exam





- Vital Signs


Reviewed Vital Signs: Yes


Vital Signs: 





                                Vital Signs x48h











  Temp Pulse Resp BP Pulse Ox


 


 02/03/21 15:00  36.6 C  73  14   100


 


 02/03/21 11:03  36.6 C  73  14  110/75  100


 


 02/03/21 10:23  35.9 C L  78  18  128/75  97














- Physical Exam


General Appearance: positive: No acute distress, Alert


Eyes Bilateral: positive: Normal inspection, PERRL, No lid inflammation, 

Conjunctivae nml.  negative: EOMI (R eye with CN III weakness, does not move 

with EOM testing)


ENT: positive: ENT inspection nml, Pharynx nml, Other (poor dentition, multiple 

missing teeth and cracked/chipped teeth)


Neck: positive: Nml inspection, Thyroid nml


Respiratory: positive: Chest non-tender, No respiratory distress, Breath sounds 

nml


Cardiovascular: positive: Regular rate & rhythm, No murmur, No gallop


Peripheral Pulses: positive: 2+


Abdomen: positive: Non-tender, No organomegaly, Nml bowel sounds, No distention


Skin: positive: Color nml


Extremities: positive: Non-tender, Full ROM, Nml appearance, Pedal edema


Neurologic/Psychiatric: positive: Oriented x3.  negative: Weakness





Sepsis Event Note (H)





- Evaluation


Current Stage of Sepsis: Ruled out





Conclusion/Plan





- Problem List


(1) Acute kidney injury


Conclusion/Plan: 


Acute kidney injury due to dehydration related to non-dose related use of 

Jardiance, newly prescribed approximately 1-2 weeks ago.  Creatinine in the ED 

was 5.6, decreased to 4.8 after 1L fluid bolus.  Prior creatinine 1.2 in 

September 2020.  Associated nausea, vomiting and low urine output at home, but h

eart rate has stayed within normal range 70s-80s.  Will stop Jardiance and 

continue IVF, trending creatinine to ensure he is improving.





1. IVF LR @ 200mL/hr


2. Monitor CMP daily, trend creatinine


3. Stop Jardiance


4. Monitor urine output








(2) Nausea


Conclusion/Plan: 


Associated with acute kidney injury, improved with IVF and decreasing 

creatinine.  Continues to report poor appetite.





1. PRN antiemetics as needed


2. Treat TOMÁS, IVF








(3) Congestive heart failure


Conclusion/Plan: 


Chronic systolic heart failure with dilated cardiomyopathy, last LVEF 55% in 

June 2020, up from 25-30% in November 2019.  Has been taking home medications 

appropriately.  No signs of exacerbation and is so far tolerating fluid 

replacement.  Will continue home meds and closely monitor urine output and signs

 of fluid overload. 113.9kg on admit today.





1. Resume home dose of Carvedilol, Lisinopril, Spironolactone and Lasix when 

appropriate.


2. Monitor CMP daily.


3. Monitor urine output closely.


4. Daily weights.


Qualifiers: 


   Heart failure type: systolic   Heart failure chronicity: chronic   Qualified 

Code(s): I50.22 - Chronic systolic (congestive) heart failure   





(4) Type 2 diabetes mellitus


Conclusion/Plan: 


Last A1c 8.3% September 2020.  Currently taking Metformin and Glipizide, 

recently started on Jardiance.  Does not check his blood sugars daily.  Reports 

he was supposed to come for diabetic education but has not been able to schedule

 a visit.  Trying to maintain a carbohydrate managed diet.  Complaining of 

blurry vision in the right eye, last eye exam was 2 months ago at which time he 

was instructed to see a retinal specialist in [place].  He has not been able to 

schedule this appointment yet.  On exam his pupil appears cloudy and he should 

be assessed for cataracts.  Additionally he was found to have cranial 

mononeuroapthy III of the right eye, as it did not move with EOM testing.  Last,

 he reports intermittent neuropathy of his hands and feet but does not have a 

Podiatrist.  No foot wounds seen on exam today.





1. Stop Jardiance.


2. Sliding Scale Insulin.


3. Carb Managed diet.


4. Diabetic education.


5. Follow up retinal specialist after discharge.


6. Check A1C.


7. Follow up PCP after discharge.


Qualifiers: 


   Diabetes mellitus long term insulin use: without long term use   Diabetes 

mellitus complication status: with ophthalmic complications   Diabetes mellitus 

complication detail: with diabetic retinopathy   Diabetic retinopathy severity: 

with unspecified retinopathy severity   Diabetes mellitus macular edema: without

 macular edema   Laterality: right   Qualified Code(s): E11.319 - Type 2 

diabetes mellitus with unspecified diabetic retinopathy without macular edema   





(5) Poor dentition


Conclusion/Plan: 


Poor dentition, likely related to previous drug use.  Missing multiple teeth, 

remaining front teeth are cracked. Does not have a dentist that he sees 

regularly, feels embarrassed about the appearance.  Complaining of front tooth 

pain, dull ache and not new.  No overt signs of tooth infection noted.





1. Follow up with dentist after discharge.


2. Social work referral for dentistry.








(6) Hypertension


Conclusion/Plan: 


Has a history of HTN.  Blood pressures this admission have been 130s/90s.  

Taking multiple cardiac meds for CHF and appears to be well controlled for HTN.





1. Resume home cardiac meds when able.








- Lab Results


Fish Bones: 


                                 02/03/21 10:40





                                 02/03/21 13:10





Core Measures





- Anticipated LOS


I expect patient to be DC'd or transferred within 96 hours.: Yes





- DVT/VTE - Prophylaxis


VTE/DVT Device ordered at admit?: Yes

## 2021-02-03 NOTE — XRAY REPORT
PROCEDURE:  Chest 1 View X-Ray

 

INDICATIONS:  cough and feverish

 

TECHNIQUE:  One view of the chest was acquired.  

 

COMPARISON:  11/18/2019

 

FINDINGS:  

 

Surgical changes and devices:  None.  

 

Lungs and pleura:  No pleural effusions or pneumothorax.  Lungs are clear.  

 

Mediastinum:  Mediastinal contours appear normal.  Heart size is normal.  

 

Bones and chest wall:  No suspicious bony lesions.  Overlying soft tissues appear unremarkable.  

 

IMPRESSION:  

No acute cardiopulmonary pathology.

 

Reviewed by: Corona Kirkland MD on 2/3/2021 10:08 AM [facility]

Approved by: Corona Kirkland MD on 2/3/2021 10:08 AM [facility]

 

 

Station ID:  [id number]

## 2021-02-04 LAB
ANION GAP SERPL CALCULATED.4IONS-SCNC: 11 MMOL/L (ref 6–13)
BASOPHILS NFR BLD AUTO: 0 10^3/UL (ref 0–0.1)
BASOPHILS NFR BLD AUTO: 0.3 %
BUN SERPL-MCNC: 60 MG/DL (ref 6–20)
CALCIUM UR-MCNC: 8.1 MG/DL (ref 8.5–10.3)
CHLORIDE SERPL-SCNC: 100 MMOL/L (ref 101–111)
CO2 SERPL-SCNC: 23 MMOL/L (ref 21–32)
CREAT SERPLBLD-SCNC: 2.6 MG/DL (ref 0.6–1.2)
EOSINOPHIL # BLD AUTO: 0.1 10^3/UL (ref 0–0.7)
EOSINOPHIL NFR BLD AUTO: 0.8 %
ERYTHROCYTE [DISTWIDTH] IN BLOOD BY AUTOMATED COUNT: 13.9 % (ref 12–15)
GLUCOSE SERPL-MCNC: 138 MG/DL (ref 70–100)
HGB UR QL STRIP: 11 G/DL (ref 14–18)
LYMPHOCYTES # SPEC AUTO: 1.5 10^3/UL (ref 1.5–3.5)
LYMPHOCYTES NFR BLD AUTO: 21.3 %
MCH RBC QN AUTO: 23.5 PG (ref 27–31)
MCHC RBC AUTO-ENTMCNC: 31 G/DL (ref 32–36)
MCV RBC AUTO: 75.7 FL (ref 80–94)
MONOCYTES # BLD AUTO: 0.7 10^3/UL (ref 0–1)
MONOCYTES NFR BLD AUTO: 9.2 %
NEUTROPHILS # BLD AUTO: 4.9 10^3/UL (ref 1.5–6.6)
NEUTROPHILS # SNV AUTO: 7.2 X10^3/UL (ref 4.8–10.8)
NEUTROPHILS NFR BLD AUTO: 68.1 %
PDW BLD AUTO: 10.1 FL (ref 7.4–11.4)
PLATELET # BLD: 283 10^3/UL (ref 130–450)
RBC MAR: 4.69 10^6/UL (ref 4.7–6.1)

## 2021-02-04 RX ADMIN — INSULIN ASPART SCH: 100 INJECTION, SOLUTION INTRAVENOUS; SUBCUTANEOUS at 20:47

## 2021-02-04 RX ADMIN — INSULIN ASPART SCH UNIT: 100 INJECTION, SOLUTION INTRAVENOUS; SUBCUTANEOUS at 21:22

## 2021-02-04 RX ADMIN — SODIUM CHLORIDE, POTASSIUM CHLORIDE, SODIUM LACTATE AND CALCIUM CHLORIDE SCH MLS/HR: 600; 310; 30; 20 INJECTION, SOLUTION INTRAVENOUS at 16:53

## 2021-02-04 RX ADMIN — INSULIN ASPART SCH UNIT: 100 INJECTION, SOLUTION INTRAVENOUS; SUBCUTANEOUS at 11:53

## 2021-02-04 RX ADMIN — SODIUM CHLORIDE, POTASSIUM CHLORIDE, SODIUM LACTATE AND CALCIUM CHLORIDE SCH MLS/HR: 600; 310; 30; 20 INJECTION, SOLUTION INTRAVENOUS at 21:48

## 2021-02-04 RX ADMIN — INSULIN ASPART SCH: 100 INJECTION, SOLUTION INTRAVENOUS; SUBCUTANEOUS at 09:34

## 2021-02-04 RX ADMIN — SODIUM CHLORIDE, POTASSIUM CHLORIDE, SODIUM LACTATE AND CALCIUM CHLORIDE SCH MLS/HR: 600; 310; 30; 20 INJECTION, SOLUTION INTRAVENOUS at 07:01

## 2021-02-04 RX ADMIN — SODIUM CHLORIDE, POTASSIUM CHLORIDE, SODIUM LACTATE AND CALCIUM CHLORIDE SCH MLS/HR: 600; 310; 30; 20 INJECTION, SOLUTION INTRAVENOUS at 01:59

## 2021-02-04 RX ADMIN — SODIUM CHLORIDE, POTASSIUM CHLORIDE, SODIUM LACTATE AND CALCIUM CHLORIDE SCH MLS/HR: 600; 310; 30; 20 INJECTION, SOLUTION INTRAVENOUS at 11:54

## 2021-02-04 RX ADMIN — SODIUM CHLORIDE, PRESERVATIVE FREE SCH: 5 INJECTION INTRAVENOUS at 09:35

## 2021-02-04 RX ADMIN — SODIUM CHLORIDE, PRESERVATIVE FREE SCH: 5 INJECTION INTRAVENOUS at 23:53

## 2021-02-04 RX ADMIN — SODIUM CHLORIDE, PRESERVATIVE FREE SCH: 5 INJECTION INTRAVENOUS at 20:48

## 2021-02-04 NOTE — PROVIDER PROGRESS NOTE
Subjective





- Prog Note Date


Prog Note Date: 02/04/21





- Subjective


Pt reports feeling: Improved (Pt reports he is feeling well, no n/v, pain, SOB 

or fevers.  Slept well overnight.  Good appetite and urine output.)





Objective





- Vital Signs/Intake & Output


Reviewed Vital Signs: Yes


Vital Signs: 


                                Vital Signs x48h











  Temp Pulse Resp BP Pulse Ox


 


 02/04/21 08:00  37.3 C  79  14  118/80  97











Intake & Output: 


                                 Intake & Output











 02/01/21 02/02/21 02/03/21 02/04/21





 23:59 23:59 23:59 23:59


 


Intake Total   4195 1980


 


Output Total   1570 5495


 


Balance   6104 -7490














- Objective


General Appearance: positive: No acute distress


Eyes Bilateral: positive: Normal inspection


Eyes: OD Abnormal EOM (cranial mononeuropathy III, fixed movement)


ENT: positive: ENT inspection nml, Pharynx nml, No signs of dehydration


Neck: positive: Nml inspection, Thyroid nml


Respiratory: positive: No respiratory distress, Breath sounds nml


Cardiovascular: positive: Regular rate & rhythm, No murmur, No gallop


Peripheral Pulses: 2+ Radial (R), 2+ Radial (L), 2+ Dorsalis pedis (R), 2+ 

Dorsalis pedis (L)


Abdomen: positive: Non-tender, No organomegaly, Nml bowel sounds, No distention


Skin: positive: Color nml


Extremities: positive: Non-tender, Full ROM, Nml appearance


Neurologic/Psychiatric: positive: Oriented x3





- Lab Results


Fish Bones: 


                                 02/04/21 04:37





                                 02/04/21 04:37


Other Labs: 


                               Lab Results x24hrs











  02/04/21 02/04/21 02/04/21 Range/Units





  07:24 04:37 04:37 


 


WBC    7.2  (4.8-10.8)  x10^3/uL


 


RBC    4.69 L  (4.70-6.10)  10^6/uL


 


Hgb    11.0 L  (14.0-18.0)  g/dL


 


Hct    35.5 L  (42.0-52.0)  %


 


MCV    75.7 L  (80.0-94.0)  fL


 


MCH    23.5 L  (27.0-31.0)  pg


 


MCHC    31.0 L  (32.0-36.0)  g/dL


 


RDW    13.9  (12.0-15.0)  %


 


Plt Count    283  (130-450)  10^3/uL


 


MPV    10.1  (7.4-11.4)  fL


 


Neut # (Auto)    4.9  (1.5-6.6)  10^3/uL


 


Lymph # (Auto)    1.5  (1.5-3.5)  10^3/uL


 


Mono # (Auto)    0.7  (0.0-1.0)  10^3/uL


 


Eos # (Auto)    0.1  (0.0-0.7)  10^3/uL


 


Baso # (Auto)    0.0  (0.0-0.1)  10^3/uL


 


Absolute Nucleated RBC    0.00  x10^3/uL


 


Nucleated RBC %    0.0  /100WBC


 


Sodium   134 L   (135-145)  mmol/L


 


Potassium   4.2   (3.5-5.0)  mmol/L


 


Chloride   100 L   (101-111)  mmol/L


 


Carbon Dioxide   23   (21-32)  mmol/L


 


Anion Gap   11.0   (6-13)  


 


BUN   60 H   (6-20)  mg/dL


 


Creatinine   2.6 H   (0.6-1.2)  mg/dL


 


Estimated GFR (MDRD)   31 L   (>89)  


 


Glucose   138 H   ()  mg/dL


 


POC Whole Bld Glucose  115 H    (70 - 100)  mg/dL


 


Estimat Average Glucose     ()  mg/dL


 


Hemoglobin A1c %     (4.27-6.07)  %


 


Lactic Acid     (0.5-2.2)  mmol/L


 


Calcium   8.1 L   (8.5-10.3)  mg/dL


 


Magnesium     (1.7-2.8)  mg/dL


 


Total Bilirubin     (0.2-1.0)  mg/dL


 


AST     (10-42)  IU/L


 


ALT     (10-60)  IU/L


 


Alkaline Phosphatase     ()  IU/L


 


Total Protein     (6.7-8.2)  g/dL


 


Albumin     (3.2-5.5)  g/dL


 


Globulin     (2.1-4.2)  g/dL


 


Albumin/Globulin Ratio     (1.0-2.2)  


 


Lipase     (22-51)  U/L


 


Urine Color     


 


Urine Clarity     (CLEAR)  


 


Urine pH     (5.0-7.5)  PH


 


Ur Specific Gravity     (1.002-1.030)  


 


Urine Protein     (NEGATIVE)  mg/dL


 


Urine Glucose (UA)     (NEGATIVE)  mg/dL


 


Urine Ketones     (NEGATIVE)  mg/dL


 


Urine Occult Blood     (NEGATIVE)  


 


Urine Nitrite     (NEGATIVE)  


 


Urine Bilirubin     (NEGATIVE)  


 


Urine Urobilinogen     (NORMAL)  E.U./dL


 


Ur Leukocyte Esterase     (NEGATIVE)  


 


Ur Microscopic Review     


 


Urine Culture Comments     


 


Nasal Adenovirus (PCR)     


 


Nasal B. parapertussis DNA (PCR)     


 


Nasal Coronavir 229E PCR     


 


Nasal Coronavir HKU1 PCR     


 


Nasal Coronavir NL63 PCR     


 


Nasal Coronavir OC43 PCR     


 


Nasal Enterovir/Rhinovir PCR     


 


Nasal Influenza B PCR     


 


Nasal Influenza A PCR     


 


Nasal Parainfluen 1 PCR     


 


Nasal Parainfluen 2 PCR     


 


Nasal Parainfluen 3 PCR     


 


Nasal Parainfluen 4 PCR     


 


Nasal RSV (PCR)     


 


Nasal B.pertussis DNA PCR     


 


Nasal C.pneumoniae (PCR)     


 


Godfrey Human Metapneumo PCR     


 


Nasal M.pneumoniae (PCR)     


 


Nasal SARS-CoV-2 (PCR)     














  02/03/21 02/03/21 02/03/21 Range/Units





  20:47 16:40 16:33 


 


WBC     (4.8-10.8)  x10^3/uL


 


RBC     (4.70-6.10)  10^6/uL


 


Hgb     (14.0-18.0)  g/dL


 


Hct     (42.0-52.0)  %


 


MCV     (80.0-94.0)  fL


 


MCH     (27.0-31.0)  pg


 


MCHC     (32.0-36.0)  g/dL


 


RDW     (12.0-15.0)  %


 


Plt Count     (130-450)  10^3/uL


 


MPV     (7.4-11.4)  fL


 


Neut # (Auto)     (1.5-6.6)  10^3/uL


 


Lymph # (Auto)     (1.5-3.5)  10^3/uL


 


Mono # (Auto)     (0.0-1.0)  10^3/uL


 


Eos # (Auto)     (0.0-0.7)  10^3/uL


 


Baso # (Auto)     (0.0-0.1)  10^3/uL


 


Absolute Nucleated RBC     x10^3/uL


 


Nucleated RBC %     /100WBC


 


Sodium     (135-145)  mmol/L


 


Potassium     (3.5-5.0)  mmol/L


 


Chloride     (101-111)  mmol/L


 


Carbon Dioxide     (21-32)  mmol/L


 


Anion Gap     (6-13)  


 


BUN     (6-20)  mg/dL


 


Creatinine     (0.6-1.2)  mg/dL


 


Estimated GFR (MDRD)     (>89)  


 


Glucose     ()  mg/dL


 


POC Whole Bld Glucose  115 H   83  (70 - 100)  mg/dL


 


Estimat Average Glucose     ()  mg/dL


 


Hemoglobin A1c %     (4.27-6.07)  %


 


Lactic Acid     (0.5-2.2)  mmol/L


 


Calcium     (8.5-10.3)  mg/dL


 


Magnesium     (1.7-2.8)  mg/dL


 


Total Bilirubin     (0.2-1.0)  mg/dL


 


AST     (10-42)  IU/L


 


ALT     (10-60)  IU/L


 


Alkaline Phosphatase     ()  IU/L


 


Total Protein     (6.7-8.2)  g/dL


 


Albumin     (3.2-5.5)  g/dL


 


Globulin     (2.1-4.2)  g/dL


 


Albumin/Globulin Ratio     (1.0-2.2)  


 


Lipase     (22-51)  U/L


 


Urine Color   YELLOW   


 


Urine Clarity   CLEAR   (CLEAR)  


 


Urine pH   5.5   (5.0-7.5)  PH


 


Ur Specific Gravity   1.020   (1.002-1.030)  


 


Urine Protein   NEGATIVE   (NEGATIVE)  mg/dL


 


Urine Glucose (UA)   250 H   (NEGATIVE)  mg/dL


 


Urine Ketones   NEGATIVE   (NEGATIVE)  mg/dL


 


Urine Occult Blood   NEGATIVE   (NEGATIVE)  


 


Urine Nitrite   NEGATIVE   (NEGATIVE)  


 


Urine Bilirubin   NEGATIVE   (NEGATIVE)  


 


Urine Urobilinogen   0.2 (NORMAL)   (NORMAL)  E.U./dL


 


Ur Leukocyte Esterase   NEGATIVE   (NEGATIVE)  


 


Ur Microscopic Review   NOT INDICATED   


 


Urine Culture Comments   NOT INDICATED   


 


Nasal Adenovirus (PCR)     


 


Nasal B. parapertussis DNA (PCR)     


 


Nasal Coronavir 229E PCR     


 


Nasal Coronavir HKU1 PCR     


 


Nasal Coronavir NL63 PCR     


 


Nasal Coronavir OC43 PCR     


 


Nasal Enterovir/Rhinovir PCR     


 


Nasal Influenza B PCR     


 


Nasal Influenza A PCR     


 


Nasal Parainfluen 1 PCR     


 


Nasal Parainfluen 2 PCR     


 


Nasal Parainfluen 3 PCR     


 


Nasal Parainfluen 4 PCR     


 


Nasal RSV (PCR)     


 


Nasal B.pertussis DNA PCR     


 


Nasal C.pneumoniae (PCR)     


 


Godfrey Human Metapneumo PCR     


 


Nasal M.pneumoniae (PCR)     


 


Nasal SARS-CoV-2 (PCR)     














  02/03/21 02/03/21 02/03/21 Range/Units





  13:10 13:10 13:10 


 


WBC     (4.8-10.8)  x10^3/uL


 


RBC     (4.70-6.10)  10^6/uL


 


Hgb     (14.0-18.0)  g/dL


 


Hct     (42.0-52.0)  %


 


MCV     (80.0-94.0)  fL


 


MCH     (27.0-31.0)  pg


 


MCHC     (32.0-36.0)  g/dL


 


RDW     (12.0-15.0)  %


 


Plt Count     (130-450)  10^3/uL


 


MPV     (7.4-11.4)  fL


 


Neut # (Auto)     (1.5-6.6)  10^3/uL


 


Lymph # (Auto)     (1.5-3.5)  10^3/uL


 


Mono # (Auto)     (0.0-1.0)  10^3/uL


 


Eos # (Auto)     (0.0-0.7)  10^3/uL


 


Baso # (Auto)     (0.0-0.1)  10^3/uL


 


Absolute Nucleated RBC     x10^3/uL


 


Nucleated RBC %     /100WBC


 


Sodium    132 L  (135-145)  mmol/L


 


Potassium    4.4  (3.5-5.0)  mmol/L


 


Chloride    95 L  (101-111)  mmol/L


 


Carbon Dioxide    22  (21-32)  mmol/L


 


Anion Gap    15.0 H  (6-13)  


 


BUN    75 H  (6-20)  mg/dL


 


Creatinine    4.8 H  (0.6-1.2)  mg/dL


 


Estimated GFR (MDRD)    15 L  (>89)  


 


Glucose    106 H  ()  mg/dL


 


POC Whole Bld Glucose     (70 - 100)  mg/dL


 


Estimat Average Glucose  166 H    ()  mg/dL


 


Hemoglobin A1c %  7.4 H    (4.27-6.07)  %


 


Lactic Acid   1.0   (0.5-2.2)  mmol/L


 


Calcium    8.2 L  (8.5-10.3)  mg/dL


 


Magnesium     (1.7-2.8)  mg/dL


 


Total Bilirubin     (0.2-1.0)  mg/dL


 


AST     (10-42)  IU/L


 


ALT     (10-60)  IU/L


 


Alkaline Phosphatase     ()  IU/L


 


Total Protein     (6.7-8.2)  g/dL


 


Albumin     (3.2-5.5)  g/dL


 


Globulin     (2.1-4.2)  g/dL


 


Albumin/Globulin Ratio     (1.0-2.2)  


 


Lipase     (22-51)  U/L


 


Urine Color     


 


Urine Clarity     (CLEAR)  


 


Urine pH     (5.0-7.5)  PH


 


Ur Specific Gravity     (1.002-1.030)  


 


Urine Protein     (NEGATIVE)  mg/dL


 


Urine Glucose (UA)     (NEGATIVE)  mg/dL


 


Urine Ketones     (NEGATIVE)  mg/dL


 


Urine Occult Blood     (NEGATIVE)  


 


Urine Nitrite     (NEGATIVE)  


 


Urine Bilirubin     (NEGATIVE)  


 


Urine Urobilinogen     (NORMAL)  E.U./dL


 


Ur Leukocyte Esterase     (NEGATIVE)  


 


Ur Microscopic Review     


 


Urine Culture Comments     


 


Nasal Adenovirus (PCR)     


 


Nasal B. parapertussis DNA (PCR)     


 


Nasal Coronavir 229E PCR     


 


Nasal Coronavir HKU1 PCR     


 


Nasal Coronavir NL63 PCR     


 


Nasal Coronavir OC43 PCR     


 


Nasal Enterovir/Rhinovir PCR     


 


Nasal Influenza B PCR     


 


Nasal Influenza A PCR     


 


Nasal Parainfluen 1 PCR     


 


Nasal Parainfluen 2 PCR     


 


Nasal Parainfluen 3 PCR     


 


Nasal Parainfluen 4 PCR     


 


Nasal RSV (PCR)     


 


Nasal B.pertussis DNA PCR     


 


Nasal C.pneumoniae (PCR)     


 


Godfrey Human Metapneumo PCR     


 


Nasal M.pneumoniae (PCR)     


 


Nasal SARS-CoV-2 (PCR)     














  02/03/21 02/03/21 02/03/21 Range/Units





  11:20 10:41 10:40 


 


WBC     (4.8-10.8)  x10^3/uL


 


RBC     (4.70-6.10)  10^6/uL


 


Hgb     (14.0-18.0)  g/dL


 


Hct     (42.0-52.0)  %


 


MCV     (80.0-94.0)  fL


 


MCH     (27.0-31.0)  pg


 


MCHC     (32.0-36.0)  g/dL


 


RDW     (12.0-15.0)  %


 


Plt Count     (130-450)  10^3/uL


 


MPV     (7.4-11.4)  fL


 


Neut # (Auto)     (1.5-6.6)  10^3/uL


 


Lymph # (Auto)     (1.5-3.5)  10^3/uL


 


Mono # (Auto)     (0.0-1.0)  10^3/uL


 


Eos # (Auto)     (0.0-0.7)  10^3/uL


 


Baso # (Auto)     (0.0-0.1)  10^3/uL


 


Absolute Nucleated RBC     x10^3/uL


 


Nucleated RBC %     /100WBC


 


Sodium    133 L  (135-145)  mmol/L


 


Potassium    4.1  (3.5-5.0)  mmol/L


 


Chloride    92 L  (101-111)  mmol/L


 


Carbon Dioxide    24  (21-32)  mmol/L


 


Anion Gap    17.0 H  (6-13)  


 


BUN    79 H  (6-20)  mg/dL


 


Creatinine    5.6 H  (0.6-1.2)  mg/dL


 


Estimated GFR (MDRD)    13 L  (>89)  


 


Glucose    69 L  ()  mg/dL


 


POC Whole Bld Glucose   63 L   (70 - 100)  mg/dL


 


Estimat Average Glucose     ()  mg/dL


 


Hemoglobin A1c %     (4.27-6.07)  %


 


Lactic Acid     (0.5-2.2)  mmol/L


 


Calcium    8.8  (8.5-10.3)  mg/dL


 


Magnesium    2.2  (1.7-2.8)  mg/dL


 


Total Bilirubin    0.7  (0.2-1.0)  mg/dL


 


AST    18  (10-42)  IU/L


 


ALT    16  (10-60)  IU/L


 


Alkaline Phosphatase    67  ()  IU/L


 


Total Protein    8.0  (6.7-8.2)  g/dL


 


Albumin    4.2  (3.2-5.5)  g/dL


 


Globulin    3.8  (2.1-4.2)  g/dL


 


Albumin/Globulin Ratio    1.1  (1.0-2.2)  


 


Lipase    53 H  (22-51)  U/L


 


Urine Color     


 


Urine Clarity     (CLEAR)  


 


Urine pH     (5.0-7.5)  PH


 


Ur Specific Gravity     (1.002-1.030)  


 


Urine Protein     (NEGATIVE)  mg/dL


 


Urine Glucose (UA)     (NEGATIVE)  mg/dL


 


Urine Ketones     (NEGATIVE)  mg/dL


 


Urine Occult Blood     (NEGATIVE)  


 


Urine Nitrite     (NEGATIVE)  


 


Urine Bilirubin     (NEGATIVE)  


 


Urine Urobilinogen     (NORMAL)  E.U./dL


 


Ur Leukocyte Esterase     (NEGATIVE)  


 


Ur Microscopic Review     


 


Urine Culture Comments     


 


Nasal Adenovirus (PCR)  NOT DETECTED    


 


Nasal B. parapertussis DNA (PCR)  NOT DETECTED    


 


Nasal Coronavir 229E PCR  NOT DETECTED    


 


Nasal Coronavir HKU1 PCR  NOT DETECTED    


 


Nasal Coronavir NL63 PCR  NOT DETECTED    


 


Nasal Coronavir OC43 PCR  NOT DETECTED    


 


Nasal Enterovir/Rhinovir PCR  NOT DETECTED    


 


Nasal Influenza B PCR  NOT DETECTED    


 


Nasal Influenza A PCR  NOT DETECTED    


 


Nasal Parainfluen 1 PCR  NOT DETECTED    


 


Nasal Parainfluen 2 PCR  NOT DETECTED    


 


Nasal Parainfluen 3 PCR  NOT DETECTED    


 


Nasal Parainfluen 4 PCR  NOT DETECTED    


 


Nasal RSV (PCR)  NOT DETECTED    


 


Nasal B.pertussis DNA PCR  NOT DETECTED    


 


Nasal C.pneumoniae (PCR)  NOT DETECTED    


 


Godfrey Human Metapneumo PCR  NOT DETECTED    


 


Nasal M.pneumoniae (PCR)  NOT DETECTED    


 


Nasal SARS-CoV-2 (PCR)  NOT DETECTED    














  02/03/21 Range/Units





  10:40 


 


WBC  5.8  (4.8-10.8)  x10^3/uL


 


RBC  4.93  (4.70-6.10)  10^6/uL


 


Hgb  11.5 L  (14.0-18.0)  g/dL


 


Hct  37.1 L  (42.0-52.0)  %


 


MCV  75.3 L  (80.0-94.0)  fL


 


MCH  23.3 L  (27.0-31.0)  pg


 


MCHC  31.0 L  (32.0-36.0)  g/dL


 


RDW  13.9  (12.0-15.0)  %


 


Plt Count  287  (130-450)  10^3/uL


 


MPV  10.2  (7.4-11.4)  fL


 


Neut # (Auto)  4.3  (1.5-6.6)  10^3/uL


 


Lymph # (Auto)  0.9 L  (1.5-3.5)  10^3/uL


 


Mono # (Auto)  0.5  (0.0-1.0)  10^3/uL


 


Eos # (Auto)  0.1  (0.0-0.7)  10^3/uL


 


Baso # (Auto)  0.0  (0.0-0.1)  10^3/uL


 


Absolute Nucleated RBC  0.00  x10^3/uL


 


Nucleated RBC %  0.0  /100WBC


 


Sodium   (135-145)  mmol/L


 


Potassium   (3.5-5.0)  mmol/L


 


Chloride   (101-111)  mmol/L


 


Carbon Dioxide   (21-32)  mmol/L


 


Anion Gap   (6-13)  


 


BUN   (6-20)  mg/dL


 


Creatinine   (0.6-1.2)  mg/dL


 


Estimated GFR (MDRD)   (>89)  


 


Glucose   ()  mg/dL


 


POC Whole Bld Glucose   (70 - 100)  mg/dL


 


Estimat Average Glucose   ()  mg/dL


 


Hemoglobin A1c %   (4.27-6.07)  %


 


Lactic Acid   (0.5-2.2)  mmol/L


 


Calcium   (8.5-10.3)  mg/dL


 


Magnesium   (1.7-2.8)  mg/dL


 


Total Bilirubin   (0.2-1.0)  mg/dL


 


AST   (10-42)  IU/L


 


ALT   (10-60)  IU/L


 


Alkaline Phosphatase   ()  IU/L


 


Total Protein   (6.7-8.2)  g/dL


 


Albumin   (3.2-5.5)  g/dL


 


Globulin   (2.1-4.2)  g/dL


 


Albumin/Globulin Ratio   (1.0-2.2)  


 


Lipase   (22-51)  U/L


 


Urine Color   


 


Urine Clarity   (CLEAR)  


 


Urine pH   (5.0-7.5)  PH


 


Ur Specific Gravity   (1.002-1.030)  


 


Urine Protein   (NEGATIVE)  mg/dL


 


Urine Glucose (UA)   (NEGATIVE)  mg/dL


 


Urine Ketones   (NEGATIVE)  mg/dL


 


Urine Occult Blood   (NEGATIVE)  


 


Urine Nitrite   (NEGATIVE)  


 


Urine Bilirubin   (NEGATIVE)  


 


Urine Urobilinogen   (NORMAL)  E.U./dL


 


Ur Leukocyte Esterase   (NEGATIVE)  


 


Ur Microscopic Review   


 


Urine Culture Comments   


 


Nasal Adenovirus (PCR)   


 


Nasal B. parapertussis DNA (PCR)   


 


Nasal Coronavir 229E PCR   


 


Nasal Coronavir HKU1 PCR   


 


Nasal Coronavir NL63 PCR   


 


Nasal Coronavir OC43 PCR   


 


Nasal Enterovir/Rhinovir PCR   


 


Nasal Influenza B PCR   


 


Nasal Influenza A PCR   


 


Nasal Parainfluen 1 PCR   


 


Nasal Parainfluen 2 PCR   


 


Nasal Parainfluen 3 PCR   


 


Nasal Parainfluen 4 PCR   


 


Nasal RSV (PCR)   


 


Nasal B.pertussis DNA PCR   


 


Nasal C.pneumoniae (PCR)   


 


Godfrey Human Metapneumo PCR   


 


Nasal M.pneumoniae (PCR)   


 


Nasal SARS-CoV-2 (PCR)   














Sepsis Event Note (H)





- Evaluation


Current Stage of Sepsis: Ruled out





Assessment/Plan





- Problem List


(1) Acute kidney injury


Impression: 


Acute kidney injury due to dehydration related to non-dose related use of 

Jardiance, newly prescribed approximately 1-2 weeks ago.  Creatinine in the ED 

was 5.6, decreased to 4.8 after 1L fluid bolus.  Prior creatinine 1.2 in 

September 2020.  Today it is down to 2.6 and continues to improve.  Denies n/v 

and has had good urine output.





1. Encourage oral intake and hydration.


2. Monitor CMP daily, trend creatinine


3. Stop Jardiance


4. Monitor urine output








(2) Nausea


Impression: 


Resolved.  Associated with acute kidney injury, improved with IVF and decreasing

creatinine.  Denies n/v today and reports having a good appetitie.





1. PRN antiemetics as needed


2. Encourage oral intake.








(3) Congestive heart failure


Impression: 


Stable.  Chronic systolic heart failure with dilated cardiomyopathy, last LVEF 

55% in June 2020, up from 25-30% in November 2019.  Has been taking home 

medications appropriately.  No signs of exacerbation and tolerated fluid 

replacement.  No SOB or wheezing, no signs of fluid overload.  Will continue 

home meds and closely monitor urine output and signs of fluid overload. 113.9kg 

on admit.





1. Resume home dose of Carvedilol, Lisinopril, Spironolactone and Lasix when 

appropriate.


2. Monitor CMP daily.


3. Monitor urine output closely.


4. Daily weights.


Qualifiers: 


   Heart failure type: systolic   Heart failure chronicity: chronic   Qualified 

Code(s): I50.22 - Chronic systolic (congestive) heart failure   





(4) Type 2 diabetes mellitus


Impression: 


Stable.  A1C yesterday was 7.4%, down from 8.3% September 2020.  Currently 

taking Metformin and Glipizide, Jardiance stopped yesterday.  Does not check his

blood sugars daily.  Reports he was supposed to come for diabetic education but 

has not been able to schedule a visit.  Trying to maintain a carbohydrate 

managed diet.  Complaining of blurry vision in the right eye, last eye exam was 

2 months ago at which time he was instructed to see a retinal specialist in 

[place].  He has not been able to schedule this appointment yet.  On exam his 

pupil appears cloudy and he should be assessed for cataracts.  Additionally he 

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 

move with EOM testing.  Last, he reports intermittent neuropathy of his hands 

and feet but does not have a Podiatrist.  No foot wounds seen on exam today.





1. Sliding Scale Insulin.


2. Carb Managed diet.


3. Diabetic education.


4. Follow up retinal specialist after discharge.


5. Follow up PCP after discharge.


Qualifiers: 


   Diabetes mellitus long term insulin use: without long term use   Diabetes 

mellitus complication status: with ophthalmic complications   Diabetes mellitus 

complication detail: with diabetic retinopathy   Diabetic retinopathy severity: 

with unspecified retinopathy severity   Diabetes mellitus macular edema: without

macular edema   Laterality: right   Qualified Code(s): E11.319 - Type 2 diabetes

mellitus with unspecified diabetic retinopathy without macular edema   





(5) Poor dentition


Impression: 


Stable. Poor dentition, likely related to previous drug use.  Missing multiple 

teeth, remaining front teeth are cracked. Does not have a dentist that he sees 

regularly, feels embarrassed about the appearance.  Complaining of front tooth 

pain, dull ache and not new.  No overt signs of tooth infection noted.





1. Follow up with dentist after discharge.


2. Social work referral for dentistry.








(6) Hypertension


Impression: 


Stable.  Has a history of HTN.  Blood pressures this admission have been 

130s/90s.  Taking multiple cardiac meds for CHF and appears to be well 

controlled for HTN.





1. Resume home cardiac meds when able.


2. Monitor BP.

## 2021-02-05 VITALS — DIASTOLIC BLOOD PRESSURE: 76 MMHG | SYSTOLIC BLOOD PRESSURE: 137 MMHG

## 2021-02-05 LAB
ANION GAP SERPL CALCULATED.4IONS-SCNC: 10 MMOL/L (ref 6–13)
BUN SERPL-MCNC: 28 MG/DL (ref 6–20)
CALCIUM UR-MCNC: 8.4 MG/DL (ref 8.5–10.3)
CHLORIDE SERPL-SCNC: 102 MMOL/L (ref 101–111)
CO2 SERPL-SCNC: 25 MMOL/L (ref 21–32)
CREAT SERPLBLD-SCNC: 1.4 MG/DL (ref 0.6–1.2)
GLUCOSE SERPL-MCNC: 97 MG/DL (ref 70–100)

## 2021-02-05 RX ADMIN — SODIUM CHLORIDE, POTASSIUM CHLORIDE, SODIUM LACTATE AND CALCIUM CHLORIDE SCH MLS/HR: 600; 310; 30; 20 INJECTION, SOLUTION INTRAVENOUS at 07:55

## 2021-02-05 RX ADMIN — INSULIN ASPART SCH: 100 INJECTION, SOLUTION INTRAVENOUS; SUBCUTANEOUS at 11:41

## 2021-02-05 RX ADMIN — SODIUM CHLORIDE, PRESERVATIVE FREE SCH: 5 INJECTION INTRAVENOUS at 09:46

## 2021-02-05 RX ADMIN — INSULIN ASPART SCH: 100 INJECTION, SOLUTION INTRAVENOUS; SUBCUTANEOUS at 07:39

## 2021-02-05 RX ADMIN — SODIUM CHLORIDE, POTASSIUM CHLORIDE, SODIUM LACTATE AND CALCIUM CHLORIDE SCH MLS/HR: 600; 310; 30; 20 INJECTION, SOLUTION INTRAVENOUS at 02:48

## 2021-02-05 NOTE — DISCHARGE SUMMARY
Discharge Summary


Admit Date: 02/03/21


Discharge Date: 02/05/21


Discharging Provider: Matilda Crawford


Primary Care Provider: Mae Cardona


Code Status: Attempt Resuscitation


Condition at Discharge: Stable


Discharge Disposition: 01 Home, Self Care





- DIAGNOSES


Discharge Diagnoses with Status of Each Condition: 





(1) Acute kidney injury


Impression: 


Improved.  Acute kidney injury due to dehydration related to non-dose related 

use of Jardiance, newly prescribed approximately 1-2 weeks ago.  Creatinine in 

the ED was 5.6.  Prior creatinine 1.2 in September 2020, baseline appears to be 

0.9-1.2.  Today it is down to 1.4 and continues to improve.  Denies n/v and has 

had good urine output and oral intake.  Encouraged to drink at least 1500mL 

fluid at home to maintain hydration and kidney function.  Will need to follow up

with PCP regarding possible need for insulin after the Jardiance was 

discontinued.





1. Encourage oral intake and hydration.


2. Stop Jardiance


3. Monitor urine output








(2) Nausea


Impression: 


Resolved.  Associated with acute kidney injury, improved with IVF and decreasing

creatinine.  Denies n/v today and reports having a good appetitie.  Has not 

required antiemetics recently.





1.  Encourage oral intake.








(3) Congestive heart failure


Impression: 


Stable.  Chronic systolic heart failure with dilated cardiomyopathy, last LVEF 

55% in June 2020, up from 25-30% in November 2019.  Has been taking home 

medications appropriately.  No signs of exacerbation and tolerated fluid 

replacement.  No SOB or wheezing, no signs of fluid overload.  Will continue 

home meds and closely monitor urine output and signs of fluid overload. 113.9kg 

on admit.  Instructed to not take Lasix x1 week while kidneys continue to impro

ve.





1. Resume home dose of Carvedilol, Lisinopril, and Spironolactone.


2. Resume Lasix in one week.


3. Monitor urine output closely.


4. Daily weights.


Qualifiers: 


   Heart failure type: systolic   Heart failure chronicity: chronic   Qualified 

Code(s): I50.22 - Chronic systolic (congestive) heart failure   





(4) Type 2 diabetes mellitus


Impression: 


Stable.  A1C 7.4%, down from 8.3% September 2020.  Currently taking Metformin 

and Glipizide, Jardiance stopped on admission.  Does not check his blood sugars 

daily.  Reports he was supposed to come for diabetic education but has not been 

able to schedule a visit. Trying to maintain a carbohydrate managed diet.  

Complaining of blurry vision in the right eye, last eye exam was 2 months ago at

which time he was instructed to see a retinal specialist in [place].  He has not

been able to schedule this appointment yet.  On exam his pupil appears cloudy 

and he should be assessed for cataracts.  Additionally he was found to have 

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 

testing.  Last, he reports intermittent neuropathy of his hands and feet but 

does not have a Podiatrist.  No foot wounds seen on exam today.





1. Follow up PCP for diabetes management, may need insulin.


2. Carb Managed diet.


3. Diabetic education-follow up outpatient as needed.


4. Follow up retinal specialist after discharge.





Qualifiers: 


   Diabetes mellitus long term insulin use: without long term use   Diabetes 

mellitus complication status: with ophthalmic complications   Diabetes mellitus 

complication detail: with diabetic retinopathy   Diabetic retinopathy severity: 

with unspecified retinopathy severity   Diabetes mellitus macular edema: without

macular edema   Laterality: right   Qualified Code(s): E11.319 - Type 2 diabetes

mellitus with unspecified diabetic retinopathy without macular edema   





(5) Poor dentition


Impression: 


Stable. Poor dentition, likely related to previous drug use.  Missing multiple 

teeth, remaining front teeth are cracked. Does not have a dentist that he sees 

regularly, feels embarrassed about the appearance.  Complaining of front tooth 

pain, dull ache and not new.  No overt signs of tooth infection noted.  Seen by 

Social Work and given information for [facility].





1. Follow up with dentist after discharge.








(6) Hypertension


Impression: 


Stable.  Has a history of HTN.  Blood pressures this admission have been 

130s/90s.  Taking multiple cardiac meds for CHF and appears to be well 

controlled for HTN.





1. Resume home cardiac meds.


2. Monitor BP.





- HPI


History of Present Illness: 





60 year old male diagnosed with DM2 in November 2019 seen in the ED for 3 days 

of nausea and vomiting. He denies recent sick contacts or change in diet.  

Denies fevers, chills, shortness of breath or fatigue.  Reports he was started 

on a new diabetes medication, Jardiance, by his PCP approximately 2 weeks ago 

(in addition to his previous medications which include Glipizide and Metformin).

 He is supposed to be checking his blood sugars daily but has not been doing so.

 His last A1C was 8.3% September 2020 and he is trying to maintain a 

carbohydrate managed diet.  He takes his medications as prescribed.  In addition

to noting new n/v over the last 3 days, he has had decreased appetite, increased

thirst (drinking multiple bottles of water a day), and decreased urine output.  

He was found to have elevated creatinine to 5.6 in the ED, up from 1.2 in 

September 2020.   Additional labs included a glucose of 69 and lactic acid 1.0. 

He was admitted for acute kidney injury related to dehydration 2/2 Jardiance and

started on IV fluid replacement.





In the ED he received a fluid bolus with good effect on the creatinine, which 

decreased to 4.8, and he was able to urinate appropriately. Regarding diabetic 

complications, he has blurry vision in the right eye as well as cranial 

mononeuropathy III.  His last eye exam was 2 months ago and he was instructed to

see a retinal specialist but has not been able to do so yet.  He has occasional 

numbness and tingling in his hands and feet.  No foot ulcers noted on physical 

exam.





Additionally, Pt has a history of chronic systolic heart failure with dilated 

cardiomyopathy diagnosed in 2019 at the time he was diagnosed with DM2.  At that

time he presented with 3 weeks SOB and intermittent chest pain after having 

recently used cocaine.  Also with a remote hx of methamphetamines and heroin.  

His troponins were found to be in the 50s and flat.  An echo showed EF 25-30%.  

He had pulmonary hypertension with a PA pressure of 60mmHg, with stress test 

negative for reversible defects.  His telemetry showed a 10 beat run of V tach. 

His admit weight at that time was 260 pounds, discharge weight of 196 pounds.  

He has been following up regularly with Cardiology, and in March 2020 was 

started on a 7 day Zio patch.  He takes Carvedilol, Lisinopril and Lasix.  A 

repeat echo in June 2020 revealed LVEF 55%.  He denies current chest pain, SOB 

at rest or on exertion. Remaining ROS negative.








- CONSULTS | PROCEDURES


Consultations: Social Work


Procedures: 





CXR 2/3 normal





- HOSPITAL COURSE


Hospital Course: 





Pt admitted with creatinine 5.6, nausea, weakness, and low urine output 1 week 

after starting Jardiance for diabetes management, in addition to Glipizide and 

Metformin.  Baseline creatinine is 0.9-1.2 and he was admitted for fluid 

resuscitation and closer monitoring.  He recieved 3L fluid bolus initially on a

dmit with good results as creatinine started to decrease.  Over the next 2 days 

the creatinine improved greatly and on day of discharge was 1.4.  Hgb A1C was 

7.4%.  He was given insulin with meals while in house, with BGs 110s-160s.  

Nausea resolved on HD 1 with fluid boluses and he has been tolerating a carb 

managed diet with appropriate po intake.  Urine output also appropriate.  He was

discharged with instructions to stop Jardiance, hold Lasix x1 week, and to 

follow up with PCP regarding diabetes management.  His CHF and HTN were well 

controlled this admission, no HTN and no concerns for fluid overload.  He was 

additionally instructed to follow up with a dentist regarding tooth pain related

to his poor dentition.





- ALLERGIES


Allergies/Adverse Reactions: 


                                    Allergies











Allergy/AdvReac Type Severity Reaction Status Date / Time


 


No Known Drug Allergies Allergy   Verified 02/03/21 10:23














- MEDICATIONS


Home Medications: 


                                Ambulatory Orders











 Medication  Instructions  Recorded  Confirmed


 


Aspirin [Aspirin EC] 81 mg PO DAILY 02/06/20 02/03/21


 


Atorvastatin Calcium 40 mg PO DAILY 02/06/20 02/03/21


 


Metformin HCl 500 mg PO BID 02/06/20 02/03/21


 


Spironolactone 25 mg PO DAILY 02/06/20 02/03/21


 


glipiZIDE [Glucotrol] 5 mg PO DAILY 02/06/20 02/03/21


 


Carvedilol [Coreg] 25 mg PO DAILY 02/03/21 02/03/21


 


Furosemide [Lasix] 40 mg PO DAILY 02/03/21 02/03/21


 


Lisinopril [Zestril] 30 mg PO DAILY 02/03/21 02/03/21


 


Sildenafil Citrate [Sildenafil] 20 mg PO PRN PRN 02/03/21 02/03/21














- PHYSICAL EXAM AT DISCHARGE


General Appearance: positive: No acute distress


Eyes Bilateral: positive: Other (Left eye PERRL, EOM intact; Right eye with 

cranial mononeuropathy III, EOM not intact.  pupil cloudy.)


ENT: positive: ENT inspection nml, Pharynx nml, No signs of dehydration


Neck: positive: Nml inspection


Respiratory: positive: Chest non-tender, No respiratory distress, Breath sounds 

nml


Cardiovascular: positive: Regular rate & rhythm, No murmur, No gallop


Peripheral Pulses: positive: 2+


Abdomen: positive: Non-tender, No organomegaly, Nml bowel sounds, No distention


Skin: positive: Color nml


Extremities: positive: Non-tender, Full ROM, Nml appearance


Neurologic/Psychiatric: positive: Oriented x3





- LABS


Result Diagrams: 


                                 02/04/21 04:37





                                 02/05/21 04:27





- DIAGNOSTIC IMAGING


Diagnostic Imaging Results: Final report reviewed


Diagnostic Imaging Results Comments: 





2/3 chest xray normal





- SEPSIS


Current Stage of Sepsis: Ruled out





- FOLLOW UP


Follow Up: 





Follow up PCP and diabetic education


Follow up with a dentist for tooth pain and poor dentition





- TIME SPENT


Time Spent in Discharge (Minutes): 35

## 2021-02-05 NOTE — DISCHARGE PLAN
Discharge Plan


Problem Reviewed?: Yes


Disposition: 01 Home, Self Care


Condition: Stable


Diet: Diabetic


Activity Restrictions: Activity as Tolerated


Shower Restrictions: No


Driving Restrictions: No


Health Concerns: 


You presented to our emergency room because you were getting more more tired, 

and having less and less urine output.  You were very nauseated.  We found you 

to be severely dehydrated.  One of the ways we measure kidney function is a 

filtration called creatinine.  Normal is between 0.6 and 1.2.  You were 5.6 in 

the emergency room.  We hydrated you, and gave you intravenous fluids to do 

that.  Your creatinine is now 1.4.  For you baseline is anywhere between 0.9 and

1.1.  We think it is the Jardiance that you were given for your diabetes that 

caused this problem.


Plan of Treatment: 


1.  Please see your primary care provider in follow-up in the next 1 week.  They

will need to check your blood work in the form of a BMP.





2.  The goal of an A1c in a diabetic is to be less than 7%.  In the past you 

were as high as 8.8% or 10%.  When you were started on Jardiance you were still 

8.8%.  Since we are having to stop your Jardiance, your primary care provider 

may have to start you on insulin.  I would recommend you be started on Lantus 10

units at night.  But that is a discussion between you and your primary care 

provider because you will need instruction on how to do that.





3.  As well as stopping your Jardiance, I also think that you should stop taking

Lasix for the next week.  Then resume it at 20 mg a day instead of 40 mg a day 

after a week.  Make sure you drink at least 1000 cc of water a day.  That is 

about 32 ounces.


Care Goals: 


1.  To get your kidney function back down to your baseline level


2.  To get your A1c is a measurement of your diabetes below 7%


Assessment: 


Patient states that he understands care goals and will promised to follow 

through


No Smoking: If you smoke, Please STOP!  Call [phone] for help.


Follow-up with: 


Lamine Maria MD [Primary Care Provider] -

## 2021-02-19 ENCOUNTER — HOSPITAL ENCOUNTER (OUTPATIENT)
Dept: HOSPITAL 76 - LAB.N | Age: 61
Discharge: HOME | End: 2021-02-19
Attending: FAMILY MEDICINE
Payer: MEDICAID

## 2021-02-19 DIAGNOSIS — D64.9: ICD-10-CM

## 2021-02-19 DIAGNOSIS — N17.9: Primary | ICD-10-CM

## 2021-02-19 LAB
ANION GAP SERPL CALCULATED.4IONS-SCNC: 9 MMOL/L (ref 6–13)
BASOPHILS NFR BLD AUTO: 0.1 10^3/UL (ref 0–0.1)
BASOPHILS NFR BLD AUTO: 1.1 %
BUN SERPL-MCNC: 21 MG/DL (ref 6–20)
CALCIUM UR-MCNC: 8.9 MG/DL (ref 8.5–10.3)
CHLORIDE SERPL-SCNC: 96 MMOL/L (ref 101–111)
CO2 SERPL-SCNC: 26 MMOL/L (ref 21–32)
CREAT SERPLBLD-SCNC: 1.4 MG/DL (ref 0.6–1.2)
EOSINOPHIL # BLD AUTO: 0.1 10^3/UL (ref 0–0.7)
EOSINOPHIL NFR BLD AUTO: 3.1 %
ERYTHROCYTE [DISTWIDTH] IN BLOOD BY AUTOMATED COUNT: 14.4 % (ref 12–15)
GLUCOSE SERPL-MCNC: 179 MG/DL (ref 70–100)
HGB UR QL STRIP: 11 G/DL (ref 14–18)
LYMPHOCYTES # SPEC AUTO: 1.7 10^3/UL (ref 1.5–3.5)
LYMPHOCYTES NFR BLD AUTO: 36.6 %
MCH RBC QN AUTO: 24 PG (ref 27–31)
MCHC RBC AUTO-ENTMCNC: 30.8 G/DL (ref 32–36)
MCV RBC AUTO: 77.8 FL (ref 80–94)
MONOCYTES # BLD AUTO: 0.3 10^3/UL (ref 0–1)
MONOCYTES NFR BLD AUTO: 7.5 %
NEUTROPHILS # BLD AUTO: 2.4 10^3/UL (ref 1.5–6.6)
NEUTROPHILS # SNV AUTO: 4.6 X10^3/UL (ref 4.8–10.8)
NEUTROPHILS NFR BLD AUTO: 51.5 %
PDW BLD AUTO: 10.8 FL (ref 7.4–11.4)
PLATELET # BLD: 315 10^3/UL (ref 130–450)
RBC MAR: 4.59 10^6/UL (ref 4.7–6.1)

## 2021-02-19 PROCEDURE — 36415 COLL VENOUS BLD VENIPUNCTURE: CPT

## 2021-02-19 PROCEDURE — 85025 COMPLETE CBC W/AUTO DIFF WBC: CPT

## 2021-02-19 PROCEDURE — 80048 BASIC METABOLIC PNL TOTAL CA: CPT

## 2021-04-09 ENCOUNTER — HOSPITAL ENCOUNTER (OUTPATIENT)
Dept: HOSPITAL 76 - LAB.WCP | Age: 61
Discharge: HOME | End: 2021-04-09
Attending: INTERNAL MEDICINE
Payer: MEDICAID

## 2021-04-09 DIAGNOSIS — E11.9: Primary | ICD-10-CM

## 2021-04-09 DIAGNOSIS — D50.9: ICD-10-CM

## 2021-04-09 DIAGNOSIS — Z12.5: ICD-10-CM

## 2021-04-09 LAB
ALBUMIN DIAFP-MCNC: 4.3 G/DL (ref 3.2–5.5)
ALBUMIN/GLOB SERPL: 1.3 {RATIO} (ref 1–2.2)
ALP SERPL-CCNC: 86 IU/L (ref 42–121)
ALT SERPL W P-5'-P-CCNC: 13 IU/L (ref 10–60)
ANION GAP SERPL CALCULATED.4IONS-SCNC: 12 MMOL/L (ref 6–13)
AST SERPL W P-5'-P-CCNC: 19 IU/L (ref 10–42)
BASOPHILS NFR BLD AUTO: 0 10^3/UL (ref 0–0.1)
BASOPHILS NFR BLD AUTO: 0.7 %
BILIRUB BLD-MCNC: 1 MG/DL (ref 0.2–1)
BUN SERPL-MCNC: 17 MG/DL (ref 6–20)
CALCIUM UR-MCNC: 9.2 MG/DL (ref 8.5–10.3)
CHLORIDE SERPL-SCNC: 97 MMOL/L (ref 101–111)
CHOLEST SERPL-MCNC: 124 MG/DL
CO2 SERPL-SCNC: 26 MMOL/L (ref 21–32)
CREAT SERPLBLD-SCNC: 1.5 MG/DL (ref 0.6–1.2)
CREAT UR-SCNC: 155.5 MG/DL
EOSINOPHIL # BLD AUTO: 0.2 10^3/UL (ref 0–0.7)
EOSINOPHIL NFR BLD AUTO: 3.2 %
ERYTHROCYTE [DISTWIDTH] IN BLOOD BY AUTOMATED COUNT: 14.8 % (ref 12–15)
EST. AVERAGE GLUCOSE BLD GHB EST-MCNC: 157 MG/DL (ref 70–100)
FERRITIN SERPL-MCNC: 357.8 NG/ML (ref 23.9–336.2)
GFRSERPLBLD MDRD-ARVRAT: 58 ML/MIN/{1.73_M2} (ref 89–?)
GLOBULIN SER-MCNC: 3.4 G/DL (ref 2.1–4.2)
GLUCOSE SERPL-MCNC: 230 MG/DL (ref 70–100)
HBA1C MFR BLD HPLC: 7.1 % (ref 4.27–6.07)
HCT VFR BLD AUTO: 37.6 % (ref 42–52)
HDLC SERPL-MCNC: 33 MG/DL
HDLC SERPL: 3.8 {RATIO} (ref ?–5)
HGB UR QL STRIP: 11.6 G/DL (ref 14–18)
IRON SATN MFR SERPL: 18 % (ref 20–50)
IRON SERPL-MCNC: 51 UG/DL (ref 45–182)
LDLC SERPL CALC-MCNC: 64 MG/DL
LDLC/HDLC SERPL: 1.9 {RATIO} (ref ?–3.6)
LYMPHOCYTES # SPEC AUTO: 1.6 10^3/UL (ref 1.5–3.5)
LYMPHOCYTES NFR BLD AUTO: 27.3 %
MCH RBC QN AUTO: 23.9 PG (ref 27–31)
MCHC RBC AUTO-ENTMCNC: 30.9 G/DL (ref 32–36)
MCV RBC AUTO: 77.4 FL (ref 80–94)
MICROALBUMIN UR-MCNC: 0.2 MG/DL (ref 0–300)
MICROALBUMIN/CREAT RATIO PNL UR: 1.3 UG/MG (ref ?–30)
MONOCYTES # BLD AUTO: 0.4 10^3/UL (ref 0–1)
MONOCYTES NFR BLD AUTO: 6.2 %
NEUTROPHILS # BLD AUTO: 3.7 10^3/UL (ref 1.5–6.6)
NEUTROPHILS # SNV AUTO: 6 X10^3/UL (ref 4.8–10.8)
NEUTROPHILS NFR BLD AUTO: 62.3 %
NRBC # BLD AUTO: 0 /100WBC
NRBC # BLD AUTO: 0 X10^3/UL
PDW BLD AUTO: 10.8 FL (ref 7.4–11.4)
PLATELET # BLD: 293 10^3/UL (ref 130–450)
POTASSIUM SERPL-SCNC: 4.3 MMOL/L (ref 3.5–5)
PROT SPEC-MCNC: 7.7 G/DL (ref 6.7–8.2)
RBC MAR: 4.86 10^6/UL (ref 4.7–6.1)
RETICS # AUTO: 0.06 10^6/UL (ref 0.02–0.11)
RETICS.HYPOCHROMIC/RBC NFR AUTO: 1.28 % (ref 0.5–2.3)
SODIUM SERPLBLD-SCNC: 135 MMOL/L (ref 135–145)
TIBC SERPL-MCNC: 284 UG/DL (ref 250–450)
TRANSFERRIN SERPL-MCNC: 203 MG/DL (ref 180–329)
TRIGL P FAST SERPL-MCNC: 133 MG/DL
TSH SERPL-ACNC: 1.63 UIU/ML (ref 0.34–5.6)
VIT B12 SERPL-MCNC: 365 PG/ML (ref 180–914)
VLDLC SERPL-SCNC: 27 MG/DL

## 2021-04-09 PROCEDURE — 80061 LIPID PANEL: CPT

## 2021-04-09 PROCEDURE — 81599 UNLISTED MAAA: CPT

## 2021-04-09 PROCEDURE — 83540 ASSAY OF IRON: CPT

## 2021-04-09 PROCEDURE — 82607 VITAMIN B-12: CPT

## 2021-04-09 PROCEDURE — 83021 HEMOGLOBIN CHROMOTOGRAPHY: CPT

## 2021-04-09 PROCEDURE — 36415 COLL VENOUS BLD VENIPUNCTURE: CPT

## 2021-04-09 PROCEDURE — 83721 ASSAY OF BLOOD LIPOPROTEIN: CPT

## 2021-04-09 PROCEDURE — 84443 ASSAY THYROID STIM HORMONE: CPT

## 2021-04-09 PROCEDURE — 85014 HEMATOCRIT: CPT

## 2021-04-09 PROCEDURE — 80053 COMPREHEN METABOLIC PANEL: CPT

## 2021-04-09 PROCEDURE — 83036 HEMOGLOBIN GLYCOSYLATED A1C: CPT

## 2021-04-09 PROCEDURE — 85018 HEMOGLOBIN: CPT

## 2021-04-09 PROCEDURE — 82570 ASSAY OF URINE CREATININE: CPT

## 2021-04-09 PROCEDURE — 84466 ASSAY OF TRANSFERRIN: CPT

## 2021-04-09 PROCEDURE — 85025 COMPLETE CBC W/AUTO DIFF WBC: CPT

## 2021-04-09 PROCEDURE — 85041 AUTOMATED RBC COUNT: CPT

## 2021-04-09 PROCEDURE — 82728 ASSAY OF FERRITIN: CPT

## 2021-04-09 PROCEDURE — 85045 AUTOMATED RETICULOCYTE COUNT: CPT

## 2021-04-09 PROCEDURE — 84153 ASSAY OF PSA TOTAL: CPT

## 2021-04-09 PROCEDURE — 82043 UR ALBUMIN QUANTITATIVE: CPT

## 2021-06-30 ENCOUNTER — HOSPITAL ENCOUNTER (OUTPATIENT)
Dept: HOSPITAL 76 - LAB.N | Age: 61
Discharge: HOME | End: 2021-06-30
Attending: INTERNAL MEDICINE
Payer: MEDICAID

## 2021-06-30 DIAGNOSIS — E11.9: Primary | ICD-10-CM

## 2021-06-30 LAB
ANION GAP SERPL CALCULATED.4IONS-SCNC: 10 MMOL/L (ref 6–13)
BUN SERPL-MCNC: 22 MG/DL (ref 6–20)
CALCIUM UR-MCNC: 8.9 MG/DL (ref 8.5–10.3)
CHLORIDE SERPL-SCNC: 100 MMOL/L (ref 101–111)
CO2 SERPL-SCNC: 27 MMOL/L (ref 21–32)
CREAT SERPLBLD-SCNC: 1.4 MG/DL (ref 0.6–1.2)
CREAT UR-SCNC: 196.4 MG/DL
EST. AVERAGE GLUCOSE BLD GHB EST-MCNC: 163 MG/DL (ref 70–100)
GFRSERPLBLD MDRD-ARVRAT: 63 ML/MIN/{1.73_M2} (ref 89–?)
GLUCOSE SERPL-MCNC: 129 MG/DL (ref 70–100)
HBA1C MFR BLD HPLC: 7.3 % (ref 4.27–6.07)
MICROALBUMIN UR-MCNC: 0.3 MG/DL (ref 0–300)
MICROALBUMIN/CREAT RATIO PNL UR: 1.5 UG/MG (ref ?–30)
POTASSIUM SERPL-SCNC: 4.1 MMOL/L (ref 3.5–5)
SODIUM SERPLBLD-SCNC: 137 MMOL/L (ref 135–145)

## 2021-06-30 PROCEDURE — 82570 ASSAY OF URINE CREATININE: CPT

## 2021-06-30 PROCEDURE — 82043 UR ALBUMIN QUANTITATIVE: CPT

## 2021-06-30 PROCEDURE — 83036 HEMOGLOBIN GLYCOSYLATED A1C: CPT

## 2021-06-30 PROCEDURE — 36415 COLL VENOUS BLD VENIPUNCTURE: CPT

## 2021-06-30 PROCEDURE — 80048 BASIC METABOLIC PNL TOTAL CA: CPT

## 2022-10-14 ENCOUNTER — HOSPITAL ENCOUNTER (OUTPATIENT)
Dept: HOSPITAL 76 - LAB.N | Age: 62
Discharge: HOME | End: 2022-10-14
Attending: INTERNAL MEDICINE
Payer: MEDICAID

## 2022-10-14 DIAGNOSIS — N18.31: ICD-10-CM

## 2022-10-14 DIAGNOSIS — E11.65: ICD-10-CM

## 2022-10-14 DIAGNOSIS — I12.9: Primary | ICD-10-CM

## 2022-10-14 DIAGNOSIS — D63.8: ICD-10-CM

## 2022-10-14 DIAGNOSIS — E11.22: ICD-10-CM

## 2022-10-14 LAB
ANION GAP SERPL CALCULATED.4IONS-SCNC: 9 MMOL/L (ref 6–13)
BASOPHILS NFR BLD AUTO: 0 10^3/UL (ref 0–0.1)
BASOPHILS NFR BLD AUTO: 0.4 %
BUN SERPL-MCNC: 16 MG/DL (ref 6–20)
CALCIUM UR-MCNC: 9.4 MG/DL (ref 8.5–10.3)
CHLORIDE SERPL-SCNC: 101 MMOL/L (ref 101–111)
CO2 SERPL-SCNC: 26 MMOL/L (ref 21–32)
CREAT SERPLBLD-SCNC: 1.4 MG/DL (ref 0.6–1.2)
EOSINOPHIL # BLD AUTO: 0.2 10^3/UL (ref 0–0.7)
EOSINOPHIL NFR BLD AUTO: 3.9 %
ERYTHROCYTE [DISTWIDTH] IN BLOOD BY AUTOMATED COUNT: 14.8 % (ref 12–15)
EST. AVERAGE GLUCOSE BLD GHB EST-MCNC: 137 MG/DL (ref 70–100)
GFRSERPLBLD MDRD-ARVRAT: 63 ML/MIN/{1.73_M2} (ref 89–?)
GLUCOSE SERPL-MCNC: 158 MG/DL (ref 70–100)
HBA1C MFR BLD HPLC: 6.4 % (ref 4.27–6.07)
HCT VFR BLD AUTO: 39.9 % (ref 42–52)
HGB UR QL STRIP: 12.4 G/DL (ref 14–18)
IRON SATN MFR SERPL: 35 % (ref 20–50)
IRON SERPL-MCNC: 95 UG/DL (ref 45–182)
LYMPHOCYTES # SPEC AUTO: 1.9 10^3/UL (ref 1.5–3.5)
LYMPHOCYTES NFR BLD AUTO: 37.4 %
MCH RBC QN AUTO: 23.8 PG (ref 27–31)
MCHC RBC AUTO-ENTMCNC: 31.1 G/DL (ref 32–36)
MCV RBC AUTO: 76.4 FL (ref 80–94)
MONOCYTES # BLD AUTO: 0.4 10^3/UL (ref 0–1)
MONOCYTES NFR BLD AUTO: 7.8 %
NEUTROPHILS # BLD AUTO: 2.6 10^3/UL (ref 1.5–6.6)
NEUTROPHILS # SNV AUTO: 5.2 X10^3/UL (ref 4.8–10.8)
NEUTROPHILS NFR BLD AUTO: 50.5 %
NRBC # BLD AUTO: 0 /100WBC
NRBC # BLD AUTO: 0 X10^3/UL
PDW BLD AUTO: 10.6 FL (ref 7.4–11.4)
PLATELET # BLD: 273 10^3/UL (ref 130–450)
POTASSIUM SERPL-SCNC: 5 MMOL/L (ref 3.5–5)
RBC MAR: 5.22 10^6/UL (ref 4.7–6.1)
SODIUM SERPLBLD-SCNC: 136 MMOL/L (ref 135–145)
TIBC SERPL-MCNC: 269 UG/DL (ref 250–450)
TRANSFERRIN SERPL-MCNC: 192 MG/DL (ref 180–329)

## 2022-10-14 PROCEDURE — 84466 ASSAY OF TRANSFERRIN: CPT

## 2022-10-14 PROCEDURE — 80048 BASIC METABOLIC PNL TOTAL CA: CPT

## 2022-10-14 PROCEDURE — 36415 COLL VENOUS BLD VENIPUNCTURE: CPT

## 2022-10-14 PROCEDURE — 85025 COMPLETE CBC W/AUTO DIFF WBC: CPT

## 2022-10-14 PROCEDURE — 83540 ASSAY OF IRON: CPT

## 2022-10-14 PROCEDURE — 83036 HEMOGLOBIN GLYCOSYLATED A1C: CPT

## 2023-05-24 ENCOUNTER — HOSPITAL ENCOUNTER (OUTPATIENT)
Dept: HOSPITAL 76 - SC | Age: 63
Discharge: HOME | End: 2023-05-24
Attending: NURSE PRACTITIONER
Payer: MEDICAID

## 2023-05-24 VITALS — DIASTOLIC BLOOD PRESSURE: 70 MMHG | SYSTOLIC BLOOD PRESSURE: 112 MMHG

## 2023-05-24 DIAGNOSIS — R53.83: Primary | ICD-10-CM

## 2023-05-24 DIAGNOSIS — R06.83: ICD-10-CM

## 2023-05-24 DIAGNOSIS — I11.0: ICD-10-CM

## 2023-05-24 DIAGNOSIS — I50.9: ICD-10-CM

## 2023-05-24 DIAGNOSIS — E66.9: ICD-10-CM

## 2023-05-24 PROCEDURE — 99203 OFFICE O/P NEW LOW 30 MIN: CPT

## 2023-05-24 PROCEDURE — 99212 OFFICE O/P EST SF 10 MIN: CPT

## 2023-05-24 NOTE — SLEEP PATIENT INSTRUCTIONS
Sleep Center Visit Summary





- Patient Visit Information


Reason for Visit: 





Initial consult for evaluation of sleep disordered breathing and other sleep 

issues.





- Patient Instructions


Instructions Attached:  Sleep Study, Sleep Clinic Visit


Additional Instructions: 





You will be completing a sleep study, either an in-lab polysomnography (PSG) or 

home sleep study (HST).  You will follow-up in the sleep care office after the 

sleep study is completed to hear the results and talk about therapy, if needed. 







You will be called by our office staff to schedule this appointment, but you may

contact us with any questions.








- Clinic Information


Contact: 





[facility]


[address] [place] [address]


[email or web address]


T: [phone]

## 2023-05-24 NOTE — SLEEP CARE CONSULTATION
Information from patient questionnaire entered by Nita Conn.





I have reviewed and concur with the information entered by Nita Conn. This 

document represents the service I personally performed and the decisions made by

me, Mayra Roland ARNP.





History of Present Illness


Service Date and Time: 05/24/2023    1336


Reason for Visit: New patient


Accompanied by: Fredy


Chief Complaint: reports: Other (CARDIOLOGIST WANTS TO RULE OUT)


Usual bedtime: 2AM


Time it takes to fall asleep: 20MINS


Snores at night: Yes (a long time ago was told he did; sleeps alone now)


Observed to quit breathing while asleep: No


Sleeps alone due to snoring: No


Number of times waking at night: 2


Reasons for waking at night: reports: Bathroom.  denies: Choking, Snoring, 

Gasping for air


Toss, Turn, or Twitch while sleeping: No


Recalls having dreams: Yes


Usually gets out of bed at: 1030AM


Feels refreshed in the morning: Yes


Morning headache: No


Sleepy or fatigued during the day: Yes


Ever fallen asleep while driving: No


Takes day naps: No


Dreams during day naps: No


Prior sleep studies: No


Additional HPI information: 





I had the pleasure of seeing DEJUAN DOTSON today regarding the possibility of 

him having a sleep disorder. His current complaint is that his cardiologist want

to rule out sleep apnea. He is accompanied by [facility] care coordinator to help

with reading/filling out paperwork. He states he was diagnosed with CHF and his 

cardiologist wants him evaluated for sleep apnea. He states he has had CHF for 

last 2 years. He has diabetes and diabetic retinopathy with limits of his sight.

He states he was told a long time ago that he snored but never that he stopped 

breathing at night. He denies waking up choking or gasping for air. He only 

wakes up about two times at night for the bathroom. He states that he wakes up 

feeling rested normally. He states his niece was just diagnosed this year with 

sleep apnea and is on a CPAP machine. 





- Parasomnia Symptoms


Ever been unable to move upon waking from sleep: No


Walks in sleep: No


Talks in sleep: No


Ever acted out dreams in sleep: No


Ever felt weak in the knees when startled or emotional: No


Bothered by creepy, crawly, restless sensations in legs: No


Problems with memory or concentration: No





Subjective


Initial Ethel Sleepiness Scale score: 10 (05/23/23)





Past Medical History


Past Medical History: reports: Hypertension, Congestive Heart Failure, Diabetes,

Other (Diabetic retinopathy)





Social History


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 Patient is Single and lives in [place]. 





Have you smoked in the past 12 months: No


Alcohol use: Yes


Alcohol amount and frequency: 2 drinks ONCE A MONTH


Caffeine use: Yes


Caffeine amount and frequency: 1 CUP coffee DAILY





Family History


Family history of sleep disordered breathing: Yes


Family Hx Sleep Apnea: Sibling: Snoring, Sleep apnea - Treated





Allergies and Home Medications


Known drug allergies: Yes (JARDIANCE)


Drug allergies reviewed: Yes


Home medication list reviewed: Yes


Allergy and home medication list: 


Allergies





No Known Drug Allergies Allergy








Review of Systems


Weight loss over past 5 years: 60


Cardiovascular: reports: high blood pressure


Respiratory: denies: shortness of breath


Gastrointestinal: denies: heartburn


Neurological: denies: headaches


Psychiatric: denies: Attention Deficit Hyperactivity, anxiety, depression


Ear/Nose/Throat: reports: tonsillectomy, wisdom teeth removed





Physical Exam


Vital signs obtained and entered by: NITA PEOPLES MA


Blood Pressure: 112/70 (LEFT ARM)


Cuff size: long


Heart Rate: 75


O2 Saturation: 98


Height: 5 ft 8 in


Weight: 234 lb 12.8 oz


Body Mass Index: 35.6


BMI Classification: Obese


Neck circumference: 17.5


Mouth and throat: narrow oropharynx


Hard palate: normal


Uvula: long, edematous


Uvula visualization: 0% Mallampati Class IV


Tongue: enlarged in size with teeth marks on lateral edges


Tonsils: absent bilaterally (he is not sure if they were removed or not; none 

seen)


Neck: normal w/o lymphadenopathy or thyromegaly


Heart: regular rate and rhythm


Lungs: clear bilaterally





Impression and Plan





1. Suspected Obstructive Sleep Apnea-Hypopnea Syndrome, as suggested by a 

history of loud and irregular snoring, and excessive daytime sleepiness. He has 

a diagnosis of congestive heart failure and diabetes.  Narrow oropharynx and obe

sity are common predisposing factors for obstructive sleep apnea-hypopnea 

syndrome. I recommend proceeding to polysomnography to confirm the diagnosis and

to assess severity. If the patient has significant sleep disordered breathing, a

manual CPAP titration study will also be performed to find the optimal treatment

pressure. I informed the patient of what the sleep studies involve and after 

some discussion, obtained agreement to proceed. The pathophysiology of 

obstructive sleep apnea-hypopnea syndrome was discussed with the patient and 

health risks of cardiovascular and cerebrovascular disease if not treated.  

Risks of drowsy driving discussed in detail and patient advised to avoid long 

distance driving and to pull over at the first sign of drowsiness. Patient 

agreed to plan. 





* Schedule polysomnography +- manual CPAP titration study and return in 1-2 

  weeks after the study to discuss result and initiate therapy.


* Avoid long distance driving or driving when feeling sleepy.


* Avoid alcohol, sedative and muscle relaxant around bedtime.


* Attempt to lose weight.


* Review instructions provided by trained office staff on how to prepare for the

  sleep study.


* Return for follow-up after sleep study completed.








Counseling Topics: Weight loss health impact


Visit Type: In Office


Time Spent with Patient (minutes): 32


Provider Statement: I spent 100% of the Face to Face Visit with the patient with

greater than 50% spent counseling the patient and coordination of care.

## 2023-06-29 ENCOUNTER — HOSPITAL ENCOUNTER (OUTPATIENT)
Dept: HOSPITAL 76 - SC | Age: 63
Discharge: HOME | End: 2023-06-29
Attending: NURSE PRACTITIONER
Payer: MEDICAID

## 2023-06-29 DIAGNOSIS — G47.33: Primary | ICD-10-CM

## 2023-06-29 PROCEDURE — 95810 POLYSOM 6/> YRS 4/> PARAM: CPT

## 2023-07-25 ENCOUNTER — HOSPITAL ENCOUNTER (OUTPATIENT)
Dept: HOSPITAL 76 - SC | Age: 63
Discharge: HOME | End: 2023-07-25
Attending: NURSE PRACTITIONER
Payer: MEDICAID

## 2023-07-25 VITALS — SYSTOLIC BLOOD PRESSURE: 128 MMHG | DIASTOLIC BLOOD PRESSURE: 82 MMHG

## 2023-07-25 DIAGNOSIS — E66.9: ICD-10-CM

## 2023-07-25 DIAGNOSIS — G47.33: Primary | ICD-10-CM

## 2023-07-25 PROCEDURE — 99212 OFFICE O/P EST SF 10 MIN: CPT

## 2023-07-25 PROCEDURE — 99213 OFFICE O/P EST LOW 20 MIN: CPT

## 2023-07-25 NOTE — SLEEP PATIENT INSTRUCTIONS
Sleep Center Visit Summary





- Patient Visit Information


Reason for Visit: 





Sleep study followup





- Patient Instructions


Instructions Attached:  CPAP, CPAP Dc


Additional Instructions: 





I recommend that you start on CPAP therapy. Please call the sleep care office to

set up once you have been able to discuss with family and physician. 





Please follow up in the sleep care office.








- Clinic Information


Contact: 





[facility]


[address] [place] [address]


[email or web address]


T: [phone]

## 2023-07-25 NOTE — SLEEP CARE CONSULTATION
Information from patient questionnaire entered by Nita Conn.





I have reviewed and concur with the information entered by Nita Conn. This 

document represents the service I personally performed and the decisions made by

me, Mayra Roland ARNP.





History of Present Illness


Service Date and Time: 07/25/2023    1328


Initial [place] Sleepiness Scale score: 10 (05/23/23)


Current [place] Sleepiness Scale score: 16 (07/25/23)


Additional HPI information: 





DEJUAN DOTSON returns for follow up and results of the recently performed 

polysomnography. 





I explained the pathophysiology behind obstructive sleep apnea. We then spent 

quite a bit of time discussing different treatment options.  For mild 

obstructive sleep apnea, surgery and oral appliance are alternatives to nasal 

CPAP therapy but in moderate or severe cases, nasal CPAP is the most effective 

and reliable treatment.   





Because apnea is primarily in supine position, then positional management 

therapy could be effective to reduce AHI. Methods discussed such as positioning 

with pillows, using a T-shirt with tennis balls in the back or commercial 

products that have a pillow format on back to prevent supine sleep. I reviewed 

the impact of weight changes on sleep apnea and strongly recommended losing 

weight.  Patient counseled not drink alcohol less than 4 hours before bedtime as

it can increase snoring and apnea.  Patient was cautioned about risks of drowsy 

driving until sleepiness symptoms resolve. Patient denies drowsy driving. 





Sleep Study





- Results


Type of Sleep Study: Polysomnography (COMPLETED 06/29/23)


Prior sleep studies: No


Polysomnography/Home Sleep Study results: 





IMPRESSION: The quality of the study is good. The patient had reduced sleep 

efficiency due to a prolonged


awakenings in the second half of the night. The sleep architecture was abnormal 

for sleep fragmentation and


reduced amount of time spent in slow wave sleep (N3). Respiratory monitoring 

showed mild obstructive sleep


apnea-hypopnea (AHI = 14.7) associated with frequent arousals, oxyhemoglobin 

desaturation and mild hypoxia


(james oxygen saturation of 84%). The respiratory events occurred predominantly 

during supine sleep (supine AHI


= 71.6; non-supine = 13.29). Snore was moderate to loud in intensity. There was 

no significant periodic leg


movement of sleep. Cardiac rhythm was normal sinus rhythm without significant 

arrhythmia. No abnormal


behavior (parasomnia) observed during the night.





Allergies and Home Medications


Known drug allergies: Yes (empagliflozin)


Drug allergies reviewed: Yes


Home medication list reviewed: Yes (no changes)


Allergy and home medication list: 


Allergies





empagliflozin [From [facility]] Allergy (Verified 07/24/23 15:21)





Review of Systems


Review of systems same as previous: Yes (no changes)





Physical Exam


Vital signs obtained and entered by: NITA PEOPLES MA


Blood Pressure: 128/82 (LEFT ARM)


Cuff size: regular


Heart Rate: 81


O2 Saturation: 98


Height: 5 ft 8 in


Weight: 244 lb


Body Mass Index: 37.0


BMI Classification: Obese





Impression and Plan





1. Obstructive Sleep Apnea-Hypopnea Syndrome, mild, with lowest oxygen 

saturation of 84%. Positive pressure therapy could benefit CHF and diabetes. I 

strongly recommended that patient start CPAP therapy but he would like to think 

about it and talk to his doctor before starting therapy.  Since patients apnea 

is primarily in supine position, patient advised to avoid sleeping supine and he

voiced understanding and agreement with plan.





2. Obesity, unspecified. Currently patients BMI is 37.  Obesity increases the 

risk of apnea, CPAP pressure requirements and overall health risks especially 

cardiovascular and diabetes. Thus patient is advised to lose weight. 





* Patient to call with decision on starting therapy.


* Attempt to lose weight.


* Avoid alcohol consumption near bedtime.


* Avoid supine sleep until using CPAP.


* The patient is again cautioned about driving until sleepiness completely 

  resolves.


* Return will depend upon when therapy is started. I will assess response to 

  therapy and compliance at that time.





Counseling Topics: Sleeping position, Weight loss health impact


Visit Type: In Office


Time Spent with Patient (minutes): 21


Provider Statement: I spent 100% of the Face to Face Visit with the patient with

greater than 50% spent counseling the patient and coordination of care.

## 2024-02-05 ENCOUNTER — HOSPITAL ENCOUNTER (OUTPATIENT)
Dept: HOSPITAL 76 - LAB.N | Age: 64
Discharge: HOME | End: 2024-02-05
Attending: INTERNAL MEDICINE
Payer: MEDICAID

## 2024-02-05 DIAGNOSIS — E78.2: ICD-10-CM

## 2024-02-05 DIAGNOSIS — E11.22: ICD-10-CM

## 2024-02-05 DIAGNOSIS — I12.9: Primary | ICD-10-CM

## 2024-02-05 DIAGNOSIS — N18.31: ICD-10-CM

## 2024-02-05 LAB
ALBUMIN DIAFP-MCNC: 4.3 G/DL (ref 3.2–5.5)
ALBUMIN/GLOB SERPL: 1.5 {RATIO} (ref 1–2.2)
ALP SERPL-CCNC: 69 IU/L (ref 42–121)
ALT SERPL W P-5'-P-CCNC: 9 IU/L (ref 10–60)
ANION GAP SERPL CALCULATED.4IONS-SCNC: 7 MMOL/L (ref 6–13)
AST SERPL W P-5'-P-CCNC: 13 IU/L (ref 10–42)
BILIRUB BLD-MCNC: 1.1 MG/DL (ref 0.2–1)
BUN SERPL-MCNC: 17 MG/DL (ref 6–20)
CALCIUM UR-MCNC: 9.6 MG/DL (ref 8.5–10.3)
CHLORIDE SERPL-SCNC: 103 MMOL/L (ref 101–111)
CHOLEST SERPL-MCNC: 89 MG/DL
CO2 SERPL-SCNC: 27 MMOL/L (ref 21–32)
CREAT SERPLBLD-SCNC: 1.5 MG/DL (ref 0.6–1.3)
EST. AVERAGE GLUCOSE BLD GHB EST-MCNC: 134 MG/DL (ref 70–100)
GFRSERPLBLD MDRD-ARVRAT: 58 ML/MIN/{1.73_M2} (ref 89–?)
GLOBULIN SER-MCNC: 2.8 G/DL (ref 2.1–4.2)
GLUCOSE SERPL-MCNC: 78 MG/DL (ref 74–104)
HBA1C MFR BLD HPLC: 6.3 % (ref 4.27–6.07)
HDLC SERPL-MCNC: 36 MG/DL
HDLC SERPL: 2.5 {RATIO} (ref ?–5)
LDLC SERPL CALC-MCNC: 36 MG/DL
LDLC/HDLC SERPL: 1 {RATIO} (ref ?–3.6)
POTASSIUM SERPL-SCNC: 4.7 MMOL/L (ref 3.5–4.5)
PROT SPEC-MCNC: 7.1 G/DL (ref 6.4–8.9)
SODIUM SERPLBLD-SCNC: 137 MMOL/L (ref 135–145)
TRIGL P FAST SERPL-MCNC: 86 MG/DL (ref 48–352)
VLDLC SERPL-SCNC: 17 MG/DL

## 2024-02-05 PROCEDURE — 80061 LIPID PANEL: CPT

## 2024-02-05 PROCEDURE — 83721 ASSAY OF BLOOD LIPOPROTEIN: CPT

## 2024-02-05 PROCEDURE — 83036 HEMOGLOBIN GLYCOSYLATED A1C: CPT

## 2024-02-05 PROCEDURE — 36415 COLL VENOUS BLD VENIPUNCTURE: CPT

## 2024-02-05 PROCEDURE — 80053 COMPREHEN METABOLIC PANEL: CPT

## 2024-08-23 ENCOUNTER — HOSPITAL ENCOUNTER (OUTPATIENT)
Dept: HOSPITAL 76 - LAB.N | Age: 64
Discharge: HOME | End: 2024-08-23
Attending: INTERNAL MEDICINE
Payer: MEDICAID

## 2024-08-23 DIAGNOSIS — E78.2: ICD-10-CM

## 2024-08-23 DIAGNOSIS — I12.9: Primary | ICD-10-CM

## 2024-08-23 DIAGNOSIS — Z12.5: ICD-10-CM

## 2024-08-23 DIAGNOSIS — D63.8: ICD-10-CM

## 2024-08-23 DIAGNOSIS — N18.31: ICD-10-CM

## 2024-08-23 DIAGNOSIS — E11.22: ICD-10-CM

## 2024-08-23 LAB
ALBUMIN DIAFP-MCNC: 4.3 G/DL (ref 3.2–5.5)
ALBUMIN/GLOB SERPL: 1.4 {RATIO} (ref 1–2.2)
ALP SERPL-CCNC: 77 IU/L (ref 42–121)
ALT SERPL W P-5'-P-CCNC: 10 IU/L (ref 10–60)
ANION GAP SERPL CALCULATED.4IONS-SCNC: 6 MMOL/L (ref 6–13)
AST SERPL W P-5'-P-CCNC: 14 IU/L (ref 10–42)
BASOPHILS NFR BLD AUTO: 0 10^3/UL (ref 0–0.1)
BASOPHILS NFR BLD AUTO: 0.7 %
BILIRUB BLD-MCNC: 1 MG/DL (ref 0.2–1)
BUN SERPL-MCNC: 17 MG/DL (ref 6–20)
CALCIUM UR-MCNC: 9.5 MG/DL (ref 8.5–10.3)
CHLORIDE SERPL-SCNC: 102 MMOL/L (ref 101–111)
CHOLEST SERPL-MCNC: 95 MG/DL
CO2 SERPL-SCNC: 28 MMOL/L (ref 21–32)
CREAT SERPLBLD-SCNC: 1.3 MG/DL (ref 0.6–1.3)
CREAT UR-SCNC: 96.2 MG/DL
EOSINOPHIL # BLD AUTO: 0.2 10^3/UL (ref 0–0.7)
EOSINOPHIL NFR BLD AUTO: 3.5 %
ERYTHROCYTE [DISTWIDTH] IN BLOOD BY AUTOMATED COUNT: 14.6 % (ref 12–15)
EST. AVERAGE GLUCOSE BLD GHB EST-MCNC: 143 MG/DL (ref 70–100)
GFRSERPLBLD MDRD-ARVRAT: 68 ML/MIN/{1.73_M2} (ref 89–?)
GLOBULIN SER-MCNC: 3.1 G/DL (ref 2.1–4.2)
GLUCOSE SERPL-MCNC: 111 MG/DL (ref 74–104)
HBA1C MFR BLD HPLC: 6.6 % (ref 4.27–6.07)
HCT VFR BLD AUTO: 43.6 % (ref 42–52)
HDLC SERPL-MCNC: 38 MG/DL
HDLC SERPL: 2.5 {RATIO} (ref ?–5)
HGB UR QL STRIP: 12.7 G/DL (ref 14–18)
LDLC SERPL CALC-MCNC: 41 MG/DL
LDLC/HDLC SERPL: 1.1 {RATIO} (ref ?–3.6)
LYMPHOCYTES # SPEC AUTO: 1.6 10^3/UL (ref 1.5–3.5)
LYMPHOCYTES NFR BLD AUTO: 28.5 %
MCH RBC QN AUTO: 22.9 PG (ref 27–31)
MCHC RBC AUTO-ENTMCNC: 29.1 G/DL (ref 32–36)
MCV RBC AUTO: 78.6 FL (ref 80–94)
MICROALBUMIN UR-MCNC: < 0.7 MG/DL
MICROALBUMIN/CREAT RATIO PNL UR: (no result) UG/MG (ref ?–30)
MONOCYTES # BLD AUTO: 0.5 10^3/UL (ref 0–1)
MONOCYTES NFR BLD AUTO: 8.3 %
NEUTROPHILS # BLD AUTO: 3.3 10^3/UL (ref 1.5–6.6)
NEUTROPHILS # SNV AUTO: 5.7 X10^3/UL (ref 4.8–10.8)
NEUTROPHILS NFR BLD AUTO: 58.8 %
NRBC # BLD AUTO: 0 /100WBC
NRBC # BLD AUTO: 0 X10^3/UL
PDW BLD AUTO: 10.7 FL (ref 7.4–11.4)
PLATELET # BLD: 297 10^3/UL (ref 130–450)
POTASSIUM SERPL-SCNC: 4.9 MMOL/L (ref 3.5–4.5)
PROT SPEC-MCNC: 7.4 G/DL (ref 6.4–8.9)
RBC MAR: 5.55 10^6/UL (ref 4.7–6.1)
SODIUM SERPLBLD-SCNC: 136 MMOL/L (ref 135–145)
TRIGL P FAST SERPL-MCNC: 82 MG/DL
VLDLC SERPL-SCNC: 16 MG/DL

## 2024-08-23 PROCEDURE — 80061 LIPID PANEL: CPT

## 2024-08-23 PROCEDURE — 82570 ASSAY OF URINE CREATININE: CPT

## 2024-08-23 PROCEDURE — 82043 UR ALBUMIN QUANTITATIVE: CPT

## 2024-08-23 PROCEDURE — 83721 ASSAY OF BLOOD LIPOPROTEIN: CPT

## 2024-08-23 PROCEDURE — 36415 COLL VENOUS BLD VENIPUNCTURE: CPT

## 2024-08-23 PROCEDURE — 84153 ASSAY OF PSA TOTAL: CPT

## 2024-08-23 PROCEDURE — 85025 COMPLETE CBC W/AUTO DIFF WBC: CPT

## 2024-08-23 PROCEDURE — 83036 HEMOGLOBIN GLYCOSYLATED A1C: CPT

## 2024-08-23 PROCEDURE — 80053 COMPREHEN METABOLIC PANEL: CPT
